# Patient Record
Sex: MALE | Race: WHITE
[De-identification: names, ages, dates, MRNs, and addresses within clinical notes are randomized per-mention and may not be internally consistent; named-entity substitution may affect disease eponyms.]

---

## 2017-02-02 ENCOUNTER — HOSPITAL ENCOUNTER (OUTPATIENT)
Age: 77
Setting detail: OBSERVATION
LOS: 1 days | Discharge: HOME | End: 2017-02-03
Payer: MEDICARE

## 2017-02-02 DIAGNOSIS — K20.9: ICD-10-CM

## 2017-02-02 DIAGNOSIS — R06.83: ICD-10-CM

## 2017-02-02 DIAGNOSIS — K44.9: ICD-10-CM

## 2017-02-02 DIAGNOSIS — N40.0: ICD-10-CM

## 2017-02-02 DIAGNOSIS — F10.20: ICD-10-CM

## 2017-02-02 DIAGNOSIS — D64.9: ICD-10-CM

## 2017-02-02 DIAGNOSIS — K21.9: ICD-10-CM

## 2017-02-02 DIAGNOSIS — Z79.82: ICD-10-CM

## 2017-02-02 DIAGNOSIS — K22.70: Primary | ICD-10-CM

## 2017-02-02 DIAGNOSIS — E66.9: ICD-10-CM

## 2017-02-02 DIAGNOSIS — I15.9: ICD-10-CM

## 2017-02-02 DIAGNOSIS — Z87.891: ICD-10-CM

## 2017-02-02 PROCEDURE — 83735 ASSAY OF MAGNESIUM: CPT

## 2017-02-02 PROCEDURE — 99284 EMERGENCY DEPT VISIT MOD MDM: CPT

## 2017-02-02 PROCEDURE — 85025 COMPLETE CBC W/AUTO DIFF WBC: CPT

## 2017-02-02 PROCEDURE — 80053 COMPREHEN METABOLIC PANEL: CPT

## 2017-02-02 PROCEDURE — 84484 ASSAY OF TROPONIN QUANT: CPT

## 2017-02-02 PROCEDURE — 81001 URINALYSIS AUTO W/SCOPE: CPT

## 2017-02-02 PROCEDURE — 83874 ASSAY OF MYOGLOBIN: CPT

## 2017-02-02 PROCEDURE — 93010 ELECTROCARDIOGRAM REPORT: CPT

## 2017-02-02 PROCEDURE — 43239 EGD BIOPSY SINGLE/MULTIPLE: CPT

## 2017-02-02 PROCEDURE — 84100 ASSAY OF PHOSPHORUS: CPT

## 2017-02-02 PROCEDURE — 93306 TTE W/DOPPLER COMPLETE: CPT

## 2017-02-02 PROCEDURE — 36415 COLL VENOUS BLD VENIPUNCTURE: CPT

## 2017-02-02 PROCEDURE — 80061 LIPID PANEL: CPT

## 2017-02-02 PROCEDURE — 99285 EMERGENCY DEPT VISIT HI MDM: CPT

## 2017-02-02 PROCEDURE — 71010: CPT

## 2017-02-02 PROCEDURE — 93005 ELECTROCARDIOGRAM TRACING: CPT

## 2017-02-02 PROCEDURE — 83690 ASSAY OF LIPASE: CPT

## 2017-02-03 PROCEDURE — 0DB38ZX EXCISION OF LOWER ESOPHAGUS, VIA NATURAL OR ARTIFICIAL OPENING ENDOSCOPIC, DIAGNOSTIC: ICD-10-PCS | Performed by: SURGERY

## 2017-03-13 ENCOUNTER — HOSPITAL ENCOUNTER (OUTPATIENT)
Age: 77
Discharge: HOME | End: 2017-03-13
Payer: MEDICARE

## 2017-03-13 DIAGNOSIS — E55.9: Primary | ICD-10-CM

## 2018-01-17 ENCOUNTER — HOSPITAL ENCOUNTER (OUTPATIENT)
Dept: HOSPITAL 76 - LAB.WCP | Age: 78
Discharge: HOME | End: 2018-01-17
Attending: FAMILY MEDICINE
Payer: MEDICARE

## 2018-01-17 DIAGNOSIS — R97.20: ICD-10-CM

## 2018-01-17 DIAGNOSIS — I10: ICD-10-CM

## 2018-01-17 DIAGNOSIS — Z13.220: Primary | ICD-10-CM

## 2018-01-17 DIAGNOSIS — E55.9: ICD-10-CM

## 2018-01-17 DIAGNOSIS — K62.5: ICD-10-CM

## 2018-01-17 LAB
ALBUMIN DIAFP-MCNC: 4.2 G/DL (ref 3.2–5.5)
ALBUMIN/GLOB SERPL: 1.3 {RATIO} (ref 1–2.2)
ALP SERPL-CCNC: 37 IU/L (ref 42–121)
ALT SERPL W P-5'-P-CCNC: 14 IU/L (ref 10–60)
ANION GAP SERPL CALCULATED.4IONS-SCNC: 5 MMOL/L (ref 6–13)
AST SERPL W P-5'-P-CCNC: 20 IU/L (ref 10–42)
BASOPHILS NFR BLD AUTO: 0.2 10^3/UL (ref 0–0.1)
BASOPHILS NFR BLD AUTO: 1.5 %
BILIRUB BLD-MCNC: 0.4 MG/DL (ref 0.2–1)
BUN SERPL-MCNC: 19 MG/DL (ref 6–20)
CALCIUM UR-MCNC: 8.7 MG/DL (ref 8.5–10.3)
CHLORIDE SERPL-SCNC: 104 MMOL/L (ref 101–111)
CHOLEST SERPL-MCNC: 183 MG/DL
CO2 SERPL-SCNC: 25 MMOL/L (ref 21–32)
CREAT SERPLBLD-SCNC: 1 MG/DL (ref 0.6–1.2)
EOSINOPHIL # BLD AUTO: 0.2 10^3/UL (ref 0–0.7)
EOSINOPHIL NFR BLD AUTO: 1.5 %
ERYTHROCYTE [DISTWIDTH] IN BLOOD BY AUTOMATED COUNT: 14.4 % (ref 12–15)
GFRSERPLBLD MDRD-ARVRAT: 73 ML/MIN/{1.73_M2} (ref 89–?)
GLOBULIN SER-MCNC: 3.2 G/DL (ref 2.1–4.2)
GLUCOSE SERPL-MCNC: 96 MG/DL (ref 70–100)
HDLC SERPL-MCNC: 67 MG/DL
HDLC SERPL: 2.7 {RATIO} (ref ?–5)
HGB UR QL STRIP: 12.4 G/DL (ref 14–18)
LDLC SERPL CALC-MCNC: 95 MG/DL
LDLC/HDLC SERPL: 1.4 {RATIO} (ref ?–3.6)
LYMPHOCYTES # SPEC AUTO: 1.5 10^3/UL (ref 1.5–3.5)
LYMPHOCYTES NFR BLD AUTO: 12.6 %
MCH RBC QN AUTO: 30.6 PG (ref 27–31)
MCHC RBC AUTO-ENTMCNC: 31.7 G/DL (ref 32–36)
MCV RBC AUTO: 96.6 FL (ref 80–94)
MONOCYTES # BLD AUTO: 0.9 10^3/UL (ref 0–1)
MONOCYTES NFR BLD AUTO: 7.8 %
NEUTROPHILS # BLD AUTO: 9.3 10^3/UL (ref 1.5–6.6)
NEUTROPHILS # SNV AUTO: 12.1 X10^3/UL (ref 4.8–10.8)
NEUTROPHILS NFR BLD AUTO: 76.6 %
PDW BLD AUTO: 9.3 FL (ref 7.4–11.4)
PLATELET # BLD: 354 10^3/UL (ref 130–450)
PROT SPEC-MCNC: 7.4 G/DL (ref 6.7–8.2)
PSA FREE MFR SERPL: 19 % (ref 25–100)
PSA FREE SERPL-MCNC: 0.74 NG/ML (ref 0.16–2.81)
PSA SERPL-MCNC: 3.97 NG/ML (ref 0–2)
RBC MAR: 4.05 10^6/UL (ref 4.7–6.1)
SODIUM SERPLBLD-SCNC: 134 MMOL/L (ref 135–145)
VLDLC SERPL-SCNC: 21 MG/DL

## 2018-01-17 PROCEDURE — 83721 ASSAY OF BLOOD LIPOPROTEIN: CPT

## 2018-01-17 PROCEDURE — 85025 COMPLETE CBC W/AUTO DIFF WBC: CPT

## 2018-01-17 PROCEDURE — 80053 COMPREHEN METABOLIC PANEL: CPT

## 2018-01-17 PROCEDURE — 82306 VITAMIN D 25 HYDROXY: CPT

## 2018-01-17 PROCEDURE — 84154 ASSAY OF PSA FREE: CPT

## 2018-01-17 PROCEDURE — 80061 LIPID PANEL: CPT

## 2018-01-17 PROCEDURE — 36415 COLL VENOUS BLD VENIPUNCTURE: CPT

## 2018-05-07 ENCOUNTER — HOSPITAL ENCOUNTER (OUTPATIENT)
Dept: HOSPITAL 76 - SDS | Age: 78
Discharge: HOME | End: 2018-05-07
Attending: SURGERY
Payer: MEDICARE

## 2018-05-07 VITALS — SYSTOLIC BLOOD PRESSURE: 106 MMHG | DIASTOLIC BLOOD PRESSURE: 86 MMHG

## 2018-05-07 DIAGNOSIS — K44.9: ICD-10-CM

## 2018-05-07 DIAGNOSIS — I10: ICD-10-CM

## 2018-05-07 DIAGNOSIS — Z79.82: ICD-10-CM

## 2018-05-07 DIAGNOSIS — Z87.891: ICD-10-CM

## 2018-05-07 DIAGNOSIS — K21.9: ICD-10-CM

## 2018-05-07 DIAGNOSIS — K22.70: Primary | ICD-10-CM

## 2018-05-07 PROCEDURE — 0DB58ZX EXCISION OF ESOPHAGUS, VIA NATURAL OR ARTIFICIAL OPENING ENDOSCOPIC, DIAGNOSTIC: ICD-10-PCS | Performed by: SURGERY

## 2018-05-07 PROCEDURE — 43239 EGD BIOPSY SINGLE/MULTIPLE: CPT

## 2018-05-07 PROCEDURE — 88305 TISSUE EXAM BY PATHOLOGIST: CPT

## 2018-06-16 ENCOUNTER — HOSPITAL ENCOUNTER (EMERGENCY)
Dept: HOSPITAL 76 - ED | Age: 78
Discharge: HOME | End: 2018-06-16
Payer: MEDICARE

## 2018-06-16 VITALS — DIASTOLIC BLOOD PRESSURE: 68 MMHG | SYSTOLIC BLOOD PRESSURE: 113 MMHG

## 2018-06-16 DIAGNOSIS — S09.90XA: Primary | ICD-10-CM
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
## 2018-06-16 DIAGNOSIS — S20.212A: ICD-10-CM

## 2018-06-16 DIAGNOSIS — Z79.82: ICD-10-CM
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
## 2018-06-16 PROCEDURE — 99283 EMERGENCY DEPT VISIT LOW MDM: CPT

## 2018-06-16 PROCEDURE — 70450 CT HEAD/BRAIN W/O DYE: CPT

## 2018-06-16 PROCEDURE — 71101 X-RAY EXAM UNILAT RIBS/CHEST: CPT

## 2018-06-16 NOTE — CT REPORT
Procedure Date:  06/16/2018   

Accession Number:  [id number] / [id number]                    

Procedure:  CT  - Head W/O CPT Code:  

 

FULL RESULT:

 

 

EXAM:

CT HEAD

 

EXAM DATE: 6/16/2018 12:54 PM.

 

CLINICAL HISTORY: Fall, head injury, vomiting.

 

COMPARISON: None.

 

TECHNIQUE: Multiaxial CT images were obtained from the foramen magnum to 

the vertex. Reformats: Coronal. IV contrast: None.

 

In accordance with CT protocol optimization, one or more of the following 

dose reduction techniques were utilized for this exam: automated exposure 

control, adjustment of mA and/or KV based on patient size, or use of 

iterative reconstructive technique.

 

FINDINGS:

Parenchyma: No intraparenchymal hemorrhage. No evidence of mass, midline 

shift, or CT findings of infarction. Gray-white differentiation is 

distinct.

 

Extraaxial Spaces: Normal for age. No subdural or epidural collections 

identified.

 

Ventricles: Normal in size and position.

 

Sinuses and Orbits: Imaged paranasal sinuses, orbits, and mastoids show 

no significant abnormality.

 

Bones: No evidence of fracture or calvarial defect.

 

Other: None.

IMPRESSION:

Ormal head CT.

 

[facility]

## 2018-06-16 NOTE — ED PHYSICIAN DOCUMENTATION
History of Present Illness





- Stated complaint


Stated Complaint: L SHOULDER/BACK PX-FALL





- Chief complaint


Chief Complaint: Back Pain





- History obtained from


History obtained from: Patient, Family





- History of Present Illness


Timing: How many days ago (4)


Pain level max: 6


Pain level now: 3





- Additonal information


Additional information: 





Patient is a 77-year-old male who tripped and fell off of his stairs 4 days ago 

at home.  Landed on his left side, striking his head and left sided ribs.  Now 

has pain in the ribs when he takes a deep breath or coughs.  States does not 

feel short of breath.  He also struck the left side of his head and had 

vomiting yesterday.  Has had mild intermittent headaches since he fell.  Has 

been taken aspirin at home for the headaches.  No other apparent injuries.  

Pain is better with rest and worse with movement.





Review of Systems


Ten Systems: 10 systems reviewed and negative


Constitutional: denies: Fever, Chills


Ears: denies: Ear pain


Nose: denies: Rhinorrhea / runny nose, Congestion


Throat: denies: Sore throat


Cardiac: denies: Chest pain / pressure


Respiratory: denies: Cough


GI: reports: Vomiting (Yesterday several times).  denies: Abdominal Pain, 

Diarrhea


Skin: denies: Rash


Musculoskeletal: denies: Neck pain, Back pain


Neurologic: denies: Focal weakness, Numbness, Confused, Altered mental status





PD PAST MEDICAL HISTORY





- Past Medical History


Past Medical History: Yes


Cardiovascular: Hypertension


Respiratory: None


Endocrine/Autoimmune: None


GI: GERD


: None


HEENT: None


Psych: None


Musculoskeletal: None


Derm: None





- Past Surgical History


General: Colonoscopy, EGD


Ortho: Knee replacement


HEENT: Cataracts





- Present Medications


Home Medications: 


 Ambulatory Orders











 Medication  Instructions  Recorded  Confirmed


 


Lisinopril 10 mg PO DAILY 01/15/16 05/07/18


 


Omeprazole 20 mg PO QDAC 01/15/16 05/07/18


 


Aspirin [Aspirin EC] 81 mg PO DAILY 02/02/17 05/07/18


 


Escitalopram [Lexapro] 10 mg PO DAILY 02/02/17 05/07/18


 


Multivitamin [Multivitamins] 1 each PO DAILY 02/02/17 05/07/18


 


Tamsulosin [Flomax] 0.4 mg PO QDDINNER 02/02/17 05/07/18


 


Acetaminophen [Tylenol] 650 mg PO Q4HR PRN #0 tablet 02/03/17 


 


Cholecalciferol (Vitamin D3) 5,000 unit PO DAILY #30 capsule 02/03/17 05/07/18





[Vitamin D3]   


 


Finasteride [Proscar] 5 mg PO DAILY  tablet 02/03/17 05/07/18


 


Omega-3 Fatty Acids/Fish Oil 1 each PO DAILYWM #90 capsule 02/03/17 05/07/18





[Omega-3 Fish Oil 1,000 mg Sfgl]   


 


Pantoprazole [Protonix] 40 mg PO QDAC #30 tablet 02/03/17 05/07/18


 


Ubidecarenone/Vitamin E Mixed 1 each PO DAILY #30 capsule 02/03/17 05/07/18





[Coq10-Vit E 200 mg-20 Unit Sfg]   


 


Hydrocodone/Acetaminophen 1 - 2 each PO Q6H PRN #9 tablet 06/16/18 





[Hydrocodon-Acetaminophen 5-325]   


 


Meloxicam [Mobic] 7.5 mg PO BID PRN #20 tablet 06/16/18 














- Allergies


Allergies/Adverse Reactions: 


 Allergies











Allergy/AdvReac Type Severity Reaction Status Date / Time


 


No Known Drug Allergies Allergy   Verified 02/02/17 15:30














- Social History


Does the pt smoke?: No


Smoking Status: Never smoker


Does the pt drink ETOH?: Yes


Does the pt have substance abuse?: No





PD ED PE NORMAL





- Vitals


Vital signs reviewed: Yes





- General


General: Alert and oriented X 3, No acute distress, Well developed/nourished





- HEENT


HEENT: PERRL, Ears normal, Moist mucous membranes, Pharynx benign, Other (

Bruising to the left temple area.  No hematoma.  Extraocular movements intact.)





- Neck


Neck: Supple, no meningeal sign, No bony TTP, Other (Full range of motion 

without pain)





- Cardiac


Cardiac: RRR, No murmur





- Respiratory


Respiratory: No respiratory distress, Clear bilaterally





- Abdomen


Abdomen: Soft, Non tender, Non distended





- Back


Back: No spinal TTP, Other (Tender palpation over the left-sided ribs, 2 

through 6, posterior axillary line.  No crepitus.  No ecchymosis.)





- Derm


Derm: Warm and dry





- Extremities


Extremities: No deformity, No tenderness to palpate, Normal ROM s pain





- Neuro


Neuro: Alert and oriented X 3, CNs 2-12 intact, No motor deficit, No sensory 

deficit, Normal speech


Eye Opening: Spontaneous


Motor: Obeys Commands


Verbal: Oriented


GCS Score: 15





- Psych


Psych: Normal mood, Normal affect





Results





- Vitals


Vitals: 


 Vital Signs - 24 hr











  06/16/18





  11:33


 


Temperature 36.5 C


 


Heart Rate 70


 


Respiratory 16





Rate 


 


Blood Pressure 114/78


 


O2 Saturation 99








 Oxygen











O2 Source                      Room air

















- Rads (name of study)


  ** Ribs w/ cxr


Radiology: Prelim report reviewed, EMP read contemporaneously, See rad report (

no acute abnormality.)





  ** head CT


Radiology: Prelim report reviewed, EMP read contemporaneously, See rad report (

normal)





PD MEDICAL DECISION MAKING





- ED course


Complexity details: reviewed results, re-evaluated patient, considered 

differential, d/w patient, d/w family


ED course: 





Patient is a 77-year-old male who presents to the emergency department with a 

closed head injury as well as a left rib injury.  No acute findings on x-ray.  

Pain well controlled.  No hypoxia.  No hemo-or pneumothorax.  We will continue 

supportive care and follow-up with his doctor.  Patient and family counseled 

regarding signs and symptoms for which I believe and urgent re-evaluation would 

be necessary. Patient with good understanding of and agreement to plan and is 

comfortable going home at this time





This document was made in part using voice recognition software. While efforts 

are made to proofread this document, sound alike and grammatical errors may 

occur.





- Sepsis Event


Vital Signs: 


 Vital Signs - 24 hr











  06/16/18





  11:33


 


Temperature 36.5 C


 


Heart Rate 70


 


Respiratory 16





Rate 


 


Blood Pressure 114/78


 


O2 Saturation 99








 Oxygen











O2 Source                      Room air

















Departure





- Departure


Disposition: 01 Home, Self Care


Clinical Impression: 


Head injury


Qualifiers:


 Encounter type: initial encounter Qualified Code(s): S09.90XA - Unspecified 

injury of head, initial encounter





Rib contusion


Qualifiers:


 Encounter type: initial encounter Laterality: left Qualified Code(s): S20.212A 

- Contusion of left front wall of thorax, initial encounter





Condition: Good


Instructions:  ED Head Injury Closed, ED Contusion Vs Minor Fx Rib


Follow-Up: 


Sky Kumar MD [Primary Care Provider] - Within 1 week


Prescriptions: 


Hydrocodone/Acetaminophen [Hydrocodon-Acetaminophen 5-325] 1 - 2 each PO Q6H 

PRN #9 tablet


 PRN Reason: pain


Meloxicam [Mobic] 7.5 mg PO BID PRN #20 tablet


 PRN Reason: Pain


Comments: 


Return if you worsen.  You may continue to have pain for a week or more.  

Sometimes ribs can take several weeks to heal.





Do not drink alcohol or drive while on narcotic pain medicine. 


Note that many narcotic pain relievers also contain tylenol/acetaminophen. 

Please ensure that your total dose of acetaminophen from all sources does not 

exceed 3 grams (3000mg) per day. 


You may constipated on this medication, take a stool softener such as "Colace" 

twice a day while you are on it. Also recommend a over-the-counter laxative 

such as senna or MiraLAX any day that you do not have a bowel movement. 


If you received narcotic pain medication in the emergency department, do not 

drive or operate machinery for the next 24 hours.

## 2018-06-16 NOTE — XRAY REPORT
Procedure Date:  06/16/2018   

Accession Number:  [id number] / [id number]                    

Procedure:  XR  - Ribs w/PA Chest LT CPT Code:  

 

FULL RESULT:

 

 

EXAM:

LEFT RIB RADIOGRAPHY

 

EXAM DATE: 6/16/2018 01:00 PM.

 

CLINICAL HISTORY: Acute pain due to trauma.

 

COMPARISON: 2/2/2017.

 

TECHNIQUE: 1 view of the chest and 2 views of the ribs.

 

FINDINGS:

Bones: Normal. No fracture or bone lesion.

 

Lungs: No focal opacities. No pneumothorax. No pleural effusions.

 

Mediastinum: Heart and mediastinal contours are unremarkable.

 

Other: None.

IMPRESSION: No rib fracture or deformity is identified.

 

[facility]

## 2019-02-21 ENCOUNTER — HOSPITAL ENCOUNTER (OUTPATIENT)
Dept: HOSPITAL 76 - LAB.WCP | Age: 79
Discharge: HOME | End: 2019-02-21
Attending: FAMILY MEDICINE
Payer: MEDICARE

## 2019-02-21 DIAGNOSIS — Z00.00: Primary | ICD-10-CM

## 2019-02-21 LAB
ALBUMIN DIAFP-MCNC: 4 G/DL (ref 3.2–5.5)
ALBUMIN/GLOB SERPL: 1.2 {RATIO} (ref 1–2.2)
ALP SERPL-CCNC: 45 IU/L (ref 42–121)
ALT SERPL W P-5'-P-CCNC: 18 IU/L (ref 10–60)
ANION GAP SERPL CALCULATED.4IONS-SCNC: 9 MMOL/L (ref 6–13)
AST SERPL W P-5'-P-CCNC: 21 IU/L (ref 10–42)
BASOPHILS NFR BLD AUTO: 0 10^3/UL (ref 0–0.1)
BASOPHILS NFR BLD AUTO: 1.1 %
BILIRUB BLD-MCNC: 0.8 MG/DL (ref 0.2–1)
BUN SERPL-MCNC: 18 MG/DL (ref 6–20)
CALCIUM UR-MCNC: 8.9 MG/DL (ref 8.5–10.3)
CHLORIDE SERPL-SCNC: 99 MMOL/L (ref 101–111)
CHOLEST SERPL-MCNC: 184 MG/DL
CO2 SERPL-SCNC: 25 MMOL/L (ref 21–32)
CREAT SERPLBLD-SCNC: 0.9 MG/DL (ref 0.6–1.2)
EOSINOPHIL # BLD AUTO: 0.2 10^3/UL (ref 0–0.7)
EOSINOPHIL NFR BLD AUTO: 3.5 %
ERYTHROCYTE [DISTWIDTH] IN BLOOD BY AUTOMATED COUNT: 13.6 % (ref 12–15)
GFRSERPLBLD MDRD-ARVRAT: 82 ML/MIN/{1.73_M2} (ref 89–?)
GLOBULIN SER-MCNC: 3.3 G/DL (ref 2.1–4.2)
GLUCOSE SERPL-MCNC: 82 MG/DL (ref 70–100)
HDLC SERPL-MCNC: 69 MG/DL
HDLC SERPL: 2.7 {RATIO} (ref ?–5)
HGB UR QL STRIP: 13 G/DL (ref 14–18)
LDLC SERPL CALC-MCNC: 104 MG/DL
LDLC/HDLC SERPL: 1.5 {RATIO} (ref ?–3.6)
LYMPHOCYTES # SPEC AUTO: 2 10^3/UL (ref 1.5–3.5)
LYMPHOCYTES NFR BLD AUTO: 43.9 %
MCH RBC QN AUTO: 31.4 PG (ref 27–31)
MCHC RBC AUTO-ENTMCNC: 32.6 G/DL (ref 32–36)
MCV RBC AUTO: 96.3 FL (ref 80–94)
MONOCYTES # BLD AUTO: 0.5 10^3/UL (ref 0–1)
MONOCYTES NFR BLD AUTO: 10.6 %
NEUTROPHILS # BLD AUTO: 1.8 10^3/UL (ref 1.5–6.6)
NEUTROPHILS # SNV AUTO: 4.5 X10^3/UL (ref 4.8–10.8)
NEUTROPHILS NFR BLD AUTO: 40.9 %
PDW BLD AUTO: 8.4 FL (ref 7.4–11.4)
PLATELET # BLD: 316 10^3/UL (ref 130–450)
PROT SPEC-MCNC: 7.3 G/DL (ref 6.7–8.2)
RBC MAR: 4.14 10^6/UL (ref 4.7–6.1)
SODIUM SERPLBLD-SCNC: 133 MMOL/L (ref 135–145)
VLDLC SERPL-SCNC: 11 MG/DL

## 2019-02-21 PROCEDURE — 84443 ASSAY THYROID STIM HORMONE: CPT

## 2019-02-21 PROCEDURE — 80053 COMPREHEN METABOLIC PANEL: CPT

## 2019-02-21 PROCEDURE — 82306 VITAMIN D 25 HYDROXY: CPT

## 2019-02-21 PROCEDURE — 85025 COMPLETE CBC W/AUTO DIFF WBC: CPT

## 2019-02-21 PROCEDURE — 80061 LIPID PANEL: CPT

## 2019-02-21 PROCEDURE — 84153 ASSAY OF PSA TOTAL: CPT

## 2019-02-21 PROCEDURE — 83721 ASSAY OF BLOOD LIPOPROTEIN: CPT

## 2019-02-21 PROCEDURE — 36415 COLL VENOUS BLD VENIPUNCTURE: CPT

## 2019-04-15 ENCOUNTER — HOSPITAL ENCOUNTER (OUTPATIENT)
Dept: HOSPITAL 76 - EMS | Age: 79
Discharge: ANOTHER HOSPITAL | End: 2019-04-15
Attending: SURGERY
Payer: MEDICARE

## 2019-04-15 ENCOUNTER — HOSPITAL ENCOUNTER (EMERGENCY)
Dept: HOSPITAL 76 - ED | Age: 79
Discharge: HOME | End: 2019-04-15
Payer: MEDICARE

## 2019-04-15 VITALS — DIASTOLIC BLOOD PRESSURE: 85 MMHG | SYSTOLIC BLOOD PRESSURE: 143 MMHG

## 2019-04-15 DIAGNOSIS — I45.10: ICD-10-CM

## 2019-04-15 DIAGNOSIS — Z87.891: ICD-10-CM

## 2019-04-15 DIAGNOSIS — R42: Primary | ICD-10-CM

## 2019-04-15 DIAGNOSIS — E86.0: ICD-10-CM

## 2019-04-15 DIAGNOSIS — I10: ICD-10-CM

## 2019-04-15 LAB
ALBUMIN DIAFP-MCNC: 3.4 G/DL (ref 3.2–5.5)
ALBUMIN/GLOB SERPL: 1.3 {RATIO} (ref 1–2.2)
ALP SERPL-CCNC: 37 IU/L (ref 42–121)
ALT SERPL W P-5'-P-CCNC: 16 IU/L (ref 10–60)
ANION GAP SERPL CALCULATED.4IONS-SCNC: 7 MMOL/L (ref 6–13)
AST SERPL W P-5'-P-CCNC: 21 IU/L (ref 10–42)
BASOPHILS NFR BLD AUTO: 0.1 10^3/UL (ref 0–0.1)
BASOPHILS NFR BLD AUTO: 0.9 %
BILIRUB BLD-MCNC: 0.6 MG/DL (ref 0.2–1)
BUN SERPL-MCNC: 17 MG/DL (ref 6–20)
CALCIUM UR-MCNC: 8.5 MG/DL (ref 8.5–10.3)
CHLORIDE SERPL-SCNC: 104 MMOL/L (ref 101–111)
CO2 SERPL-SCNC: 24 MMOL/L (ref 21–32)
CREAT SERPLBLD-SCNC: 1 MG/DL (ref 0.6–1.2)
EOSINOPHIL # BLD AUTO: 0.2 10^3/UL (ref 0–0.7)
EOSINOPHIL NFR BLD AUTO: 2.9 %
ERYTHROCYTE [DISTWIDTH] IN BLOOD BY AUTOMATED COUNT: 14.1 % (ref 12–15)
GFRSERPLBLD MDRD-ARVRAT: 73 ML/MIN/{1.73_M2} (ref 89–?)
GLOBULIN SER-MCNC: 2.6 G/DL (ref 2.1–4.2)
GLUCOSE SERPL-MCNC: 102 MG/DL (ref 70–100)
HGB UR QL STRIP: 11.9 G/DL (ref 14–18)
LIPASE SERPL-CCNC: 37 U/L (ref 22–51)
LYMPHOCYTES # SPEC AUTO: 1.8 10^3/UL (ref 1.5–3.5)
LYMPHOCYTES NFR BLD AUTO: 30.6 %
MCH RBC QN AUTO: 31.4 PG (ref 27–31)
MCHC RBC AUTO-ENTMCNC: 33.4 G/DL (ref 32–36)
MCV RBC AUTO: 94 FL (ref 80–94)
MONOCYTES # BLD AUTO: 0.7 10^3/UL (ref 0–1)
MONOCYTES NFR BLD AUTO: 11.3 %
NEUTROPHILS # BLD AUTO: 3.3 10^3/UL (ref 1.5–6.6)
NEUTROPHILS # SNV AUTO: 6 X10^3/UL (ref 4.8–10.8)
NEUTROPHILS NFR BLD AUTO: 54.3 %
PDW BLD AUTO: 7.7 FL (ref 7.4–11.4)
PLATELET # BLD: 285 10^3/UL (ref 130–450)
PROT SPEC-MCNC: 6 G/DL (ref 6.7–8.2)
RBC MAR: 3.79 10^6/UL (ref 4.7–6.1)
SODIUM SERPLBLD-SCNC: 135 MMOL/L (ref 135–145)

## 2019-04-15 PROCEDURE — 71045 X-RAY EXAM CHEST 1 VIEW: CPT

## 2019-04-15 PROCEDURE — 93005 ELECTROCARDIOGRAM TRACING: CPT

## 2019-04-15 PROCEDURE — 84484 ASSAY OF TROPONIN QUANT: CPT

## 2019-04-15 PROCEDURE — 85025 COMPLETE CBC W/AUTO DIFF WBC: CPT

## 2019-04-15 PROCEDURE — 96360 HYDRATION IV INFUSION INIT: CPT

## 2019-04-15 PROCEDURE — 83690 ASSAY OF LIPASE: CPT

## 2019-04-15 PROCEDURE — 36415 COLL VENOUS BLD VENIPUNCTURE: CPT

## 2019-04-15 PROCEDURE — 80053 COMPREHEN METABOLIC PANEL: CPT

## 2019-04-15 PROCEDURE — 99283 EMERGENCY DEPT VISIT LOW MDM: CPT

## 2019-04-15 NOTE — ED PHYSICIAN DOCUMENTATION
History of Present Illness





- Stated complaint


Stated Complaint: DIZZINESS





- Chief complaint


Chief Complaint: General





- History obtained from


History obtained from: Patient, EMS





- History of Present Illness


Timing: Today (After doing some outdoor labor this morning felt dizzy starting 

after going from sitting to standing. BIBA. Describes it as lightheaded. It was 

bad enough that he felt he could not stand up d/t same.)





Review of Systems


GI: reports: Nausea, Vomiting (chronic)





PD PAST MEDICAL HISTORY





- Past Medical History


Past Medical History: Yes


Cardiovascular: Hypertension


Respiratory: None


Endocrine/Autoimmune: None


GI: GERD


: None


HEENT: None


Psych: None


Musculoskeletal: None


Derm: None





- Past Surgical History


Past Surgical History: Yes


General: Colonoscopy, EGD


Ortho: Knee replacement


HEENT: Cataracts





- Present Medications


Home Medications: 


                                Ambulatory Orders











 Medication  Instructions  Recorded  Confirmed


 


Lisinopril 10 mg PO DAILY 01/15/16 05/07/18


 


Omeprazole 20 mg PO QDAC 01/15/16 05/07/18


 


Aspirin [Aspirin EC] 81 mg PO DAILY 02/02/17 05/07/18


 


Escitalopram [Lexapro] 10 mg PO DAILY 02/02/17 05/07/18


 


Multivitamin [Multivitamins] 1 each PO DAILY 02/02/17 05/07/18


 


Tamsulosin [Flomax] 0.4 mg PO QDDINNER 02/02/17 05/07/18


 


Acetaminophen [Tylenol] 650 mg PO Q4HR PRN #0 tablet 02/03/17 


 


Cholecalciferol (Vitamin D3) 5,000 unit PO DAILY #30 capsule 02/03/17 05/07/18





[Vitamin D3]   


 


Finasteride [Proscar] 5 mg PO DAILY  tablet 02/03/17 05/07/18


 


Omega-3 Fatty Acids/Fish Oil 1 each PO DAILYWM #90 capsule 02/03/17 05/07/18





[Omega-3 Fish Oil 1,000 mg Sfgl]   


 


Pantoprazole [Protonix] 40 mg PO QDAC #30 tablet 02/03/17 05/07/18


 


Ubidecarenone/Vitamin E Mixed 1 each PO DAILY #30 capsule 02/03/17 05/07/18





[Coq10-Vit E 200 mg-20 Unit Sfg]   


 


Hydrocodone/Acetaminophen 1 - 2 each PO Q6H PRN #9 tablet 06/16/18 





[Hydrocodon-Acetaminophen 5-325]   


 


Meloxicam [Mobic] 7.5 mg PO BID PRN #20 tablet 06/16/18 














- Allergies


Allergies/Adverse Reactions: 


                                    Allergies











Allergy/AdvReac Type Severity Reaction Status Date / Time


 


No Known Drug Allergies Allergy   Verified 04/15/19 12:08














- Social History


Does the pt smoke?: Yes


Smoking Status: Former smoker


Does the pt drink ETOH?: Yes


ETOH Use: Beer


Does the pt have substance abuse?: No





- Family History


Family history: reports: Non contributory





PD ED PE NORMAL





- Vitals


Vital signs reviewed: Yes





- General


General: Alert and oriented X 3, No acute distress





- HEENT


HEENT: PERRL, EOMI





- Neck


Neck: Supple, no meningeal sign, No bony TTP





- Cardiac


Cardiac: RRR, No murmur, Other (Somewhat muffled heart sounds, bedside 

ultrasound demonstrates no pericardial effusion.)





- Respiratory


Respiratory: No respiratory distress, Clear bilaterally





- Abdomen


Abdomen: Soft, Non tender





- Back


Back: No CVA TTP, No spinal TTP





- Derm


Derm: Normal color, Warm and dry





- Extremities


Extremities: No edema, No calf tenderness / cord





- Neuro


Neuro: Alert and oriented X 3, Normal speech





- Psych


Psych: Normal mood, Normal affect





Results





- Vitals


Vitals: 


                               Vital Signs - 24 hr











  04/15/19





  12:08


 


Temperature 36.1 C L


 


Heart Rate 87


 


Respiratory 18





Rate 


 


Blood Pressure 121/75


 


O2 Saturation 98








                                     Oxygen











O2 Source                      Room air

















- EKG (time done)


  ** 1208


Rate: Rate (enter#) (66)


Rhythm: NSR


Axis: Normal


Intervals: RBBB


QRS: Normal


Ischemia: Normal ST segments


Computer interpretation: Agree with computer





- Labs


Labs: 


                                Laboratory Tests











  04/15/19 04/15/19 04/15/19





  12:35 12:35 12:35


 


WBC  6.0  


 


RBC  3.79 L  


 


Hgb  11.9 L  


 


Hct  35.6 L  


 


MCV  94.0  


 


MCH  31.4 H  


 


MCHC  33.4  


 


RDW  14.1  


 


Plt Count  285  


 


MPV  7.7  


 


Neut # (Auto)  3.3  


 


Lymph # (Auto)  1.8  


 


Mono # (Auto)  0.7  


 


Eos # (Auto)  0.2  


 


Baso # (Auto)  0.1  


 


Absolute Nucleated RBC  0.00  


 


Nucleated RBC %  0.0  


 


Sodium   135 


 


Potassium   4.0 


 


Chloride   104 


 


Carbon Dioxide   24 


 


Anion Gap   7.0 


 


BUN   17 


 


Creatinine   1.0 


 


Estimated GFR (MDRD)   73 L 


 


Glucose   102 H 


 


Calcium   8.5 


 


Total Bilirubin   0.6 


 


AST   21 


 


ALT   16 


 


Alkaline Phosphatase   37 L 


 


Troponin I    < 0.04


 


Total Protein   6.0 L 


 


Albumin   3.4 


 


Globulin   2.6 


 


Albumin/Globulin Ratio   1.3 


 


Lipase   37 














- Rads (name of study)


  ** 1v chest


Radiology: EMP read contemporaneously (low volumes, NAD)





PD MEDICAL DECISION MAKING





- ED course


ED course: 





78-year-old gentleman after an episode of dizziness this morning.  Further 

history suggests that it is kind of a combination of disequilibrium and 

orthostasis confirmed with positive orthostatic vital signs here.  After IV 

fluids he was feeling much better.  His neurologic examination is normal and he 

ambulated in the [place] without difficulty or disequilibrium.





Departure





- Departure


Disposition: 01 Home, Self Care


Clinical Impression: 


 Dizziness, Dehydration





Condition: Good


Instructions:  ED Dehydration, ED Dizziness [facility]


Comments: 


Call your doctor to arrange a follow-up appointment, make the next available 

appointment.  In the interim, return anytime if worse or if new symptoms 

develop.

## 2019-04-15 NOTE — XRAY REPORT
Reason:  cp

Procedure Date:  04/15/2019   

Accession Number:  [id number] / [id number]                    

Procedure:  XR  - Chest 1 View X-Ray CPT Code:  46438

 

FULL RESULT:

 

 

EXAM:

CHEST RADIOGRAPHY

 

EXAM DATE: 4/15/2019 12:33 PM.

 

CLINICAL HISTORY: Cp.

 

COMPARISON: RIBS W/PA CHEST LT 06/16/2018 12:46 PM.

 

TECHNIQUE: 1 view.

 

FINDINGS:

Lungs/Pleura: Mildly low lung volumes. No focal lung consolidation. No 

large effusion. No pneumothorax.

 

Mediastinum: Cardiac silhouette size appears unremarkable. Mild vascular 

calcification.

 

Other: None.

 

IMPRESSION: Mildly low lung volumes. No focal lung consolidation or large 

effusions.

 

[facility]

## 2019-07-15 ENCOUNTER — HOSPITAL ENCOUNTER (EMERGENCY)
Dept: HOSPITAL 76 - ED | Age: 79
Discharge: HOME | End: 2019-07-15
Payer: MEDICARE

## 2019-07-15 VITALS — DIASTOLIC BLOOD PRESSURE: 77 MMHG | SYSTOLIC BLOOD PRESSURE: 113 MMHG

## 2019-07-15 DIAGNOSIS — I10: ICD-10-CM

## 2019-07-15 DIAGNOSIS — Z87.891: ICD-10-CM

## 2019-07-15 DIAGNOSIS — E86.0: Primary | ICD-10-CM

## 2019-07-15 DIAGNOSIS — I45.10: ICD-10-CM

## 2019-07-15 DIAGNOSIS — Z79.82: ICD-10-CM

## 2019-07-15 LAB
ALBUMIN DIAFP-MCNC: 4 G/DL (ref 3.2–5.5)
ALBUMIN/GLOB SERPL: 1.3 {RATIO} (ref 1–2.2)
ALP SERPL-CCNC: 51 IU/L (ref 42–121)
ALT SERPL W P-5'-P-CCNC: 17 IU/L (ref 10–60)
ANION GAP SERPL CALCULATED.4IONS-SCNC: 13 MMOL/L (ref 6–13)
AST SERPL W P-5'-P-CCNC: 20 IU/L (ref 10–42)
BASOPHILS NFR BLD AUTO: 0.1 10^3/UL (ref 0–0.1)
BASOPHILS NFR BLD AUTO: 0.9 %
BILIRUB BLD-MCNC: 0.6 MG/DL (ref 0.2–1)
BILIRUB UR QL CFM: NEGATIVE
BUN SERPL-MCNC: 18 MG/DL (ref 6–20)
CALCIUM UR-MCNC: 9.2 MG/DL (ref 8.5–10.3)
CASTS URNS QL MICRO: (no result) /LPF
CHLORIDE SERPL-SCNC: 102 MMOL/L (ref 101–111)
CLARITY UR REFRACT.AUTO: CLEAR
CO2 SERPL-SCNC: 21 MMOL/L (ref 21–32)
CREAT SERPLBLD-SCNC: 1.3 MG/DL (ref 0.6–1.2)
EOSINOPHIL # BLD AUTO: 0.2 10^3/UL (ref 0–0.7)
EOSINOPHIL NFR BLD AUTO: 2 %
ERYTHROCYTE [DISTWIDTH] IN BLOOD BY AUTOMATED COUNT: 13.2 % (ref 12–15)
GFRSERPLBLD MDRD-ARVRAT: 54 ML/MIN/{1.73_M2} (ref 89–?)
GLOBULIN SER-MCNC: 3.1 G/DL (ref 2.1–4.2)
GLUCOSE SERPL-MCNC: 110 MG/DL (ref 70–100)
GLUCOSE UR QL STRIP.AUTO: NEGATIVE MG/DL
HGB UR QL STRIP: 12.8 G/DL (ref 14–18)
KETONES UR QL STRIP.AUTO: 15 MG/DL
LIPASE SERPL-CCNC: 44 U/L (ref 22–51)
LYMPHOCYTES # SPEC AUTO: 2.2 10^3/UL (ref 1.5–3.5)
LYMPHOCYTES NFR BLD AUTO: 29.5 %
MCH RBC QN AUTO: 31.3 PG (ref 27–31)
MCHC RBC AUTO-ENTMCNC: 33.5 G/DL (ref 32–36)
MCV RBC AUTO: 93.4 FL (ref 80–94)
MONOCYTES # BLD AUTO: 0.7 10^3/UL (ref 0–1)
MONOCYTES NFR BLD AUTO: 8.7 %
NEUTROPHILS # BLD AUTO: 4.4 10^3/UL (ref 1.5–6.6)
NEUTROPHILS # SNV AUTO: 7.6 X10^3/UL (ref 4.8–10.8)
NEUTROPHILS NFR BLD AUTO: 58.5 %
NITRITE UR QL STRIP.AUTO: NEGATIVE
PDW BLD AUTO: 10.2 FL (ref 7.4–11.4)
PH UR STRIP.AUTO: 5.5 PH (ref 5–7.5)
PLATELET # BLD: 304 10^3/UL (ref 130–450)
PROT SPEC-MCNC: 7.1 G/DL (ref 6.7–8.2)
PROT UR STRIP.AUTO-MCNC: 30 MG/DL
RBC # UR STRIP.AUTO: NEGATIVE /UL
RBC # URNS HPF: (no result) /HPF (ref 0–5)
RBC MAR: 4.09 10^6/UL (ref 4.7–6.1)
SODIUM SERPLBLD-SCNC: 136 MMOL/L (ref 135–145)
SP GR UR STRIP.AUTO: 1.02 (ref 1–1.03)
SQUAMOUS URNS QL MICRO: (no result)
UROBILINOGEN UR QL STRIP.AUTO: (no result) E.U./DL
UROBILINOGEN UR STRIP.AUTO-MCNC: NEGATIVE MG/DL

## 2019-07-15 PROCEDURE — 81003 URINALYSIS AUTO W/O SCOPE: CPT

## 2019-07-15 PROCEDURE — 93005 ELECTROCARDIOGRAM TRACING: CPT

## 2019-07-15 PROCEDURE — 96360 HYDRATION IV INFUSION INIT: CPT

## 2019-07-15 PROCEDURE — 85025 COMPLETE CBC W/AUTO DIFF WBC: CPT

## 2019-07-15 PROCEDURE — 99283 EMERGENCY DEPT VISIT LOW MDM: CPT

## 2019-07-15 PROCEDURE — 81001 URINALYSIS AUTO W/SCOPE: CPT

## 2019-07-15 PROCEDURE — 36415 COLL VENOUS BLD VENIPUNCTURE: CPT

## 2019-07-15 PROCEDURE — 83690 ASSAY OF LIPASE: CPT

## 2019-07-15 PROCEDURE — 80053 COMPREHEN METABOLIC PANEL: CPT

## 2019-07-15 PROCEDURE — 71045 X-RAY EXAM CHEST 1 VIEW: CPT

## 2019-07-15 PROCEDURE — 87086 URINE CULTURE/COLONY COUNT: CPT

## 2019-07-15 PROCEDURE — 84484 ASSAY OF TROPONIN QUANT: CPT

## 2019-07-15 NOTE — XRAY REPORT
Reason:  dizziness/palpitations

Procedure Date:  07/15/2019   

Accession Number:  [id number] / [id number]                    

Procedure:  XR  - Chest 1 View X-Ray CPT Code:  71203

 

FULL RESULT:

 

 

EXAM:

CHEST RADIOGRAPHY

 

EXAM DATE: 7/15/2019 05:00 PM.

 

CLINICAL HISTORY: Dizziness. Palpitations.

 

COMPARISON: CHEST 1 VIEW 04/15/2019 12:20 PM

RIBS W/PA CHEST LT 06/16/2018 12:46 PM.

 

TECHNIQUE: 1 view.

 

FINDINGS:

Lungs/Pleura: No focal opacities evident. No pleural effusion. No 

pneumothorax.

 

Mediastinum: Within exam limitations, the cardiomediastinal contour is 

normal. Prominent cardiophrenic fat pads noted.

 

Other: None.

 

IMPRESSION: Normal single view chest.

 

[facility]

## 2019-07-15 NOTE — ED PHYSICIAN DOCUMENTATION
History of Present Illness





- Stated complaint


Stated Complaint: LOW BLOOD PRESSURE, HIGH HEART RATE





- Chief complaint


Chief Complaint: Cardiac





- History obtained from


History obtained from: Patient





- History of Present Illness


Timing: Today


Pain level max: 0


Pain level now: 0





- Additonal information


Additional information: 





states felt dizzy while driving today. States BP was 130/80, then 30 mins later 

was 80/40.  No vomiting. No headches. no chest pain. no palpitations.  Felt 

lightheaded. No similar symptoms in the past. no neck or back pain,





Review of Systems


Constitutional: denies: Fever, Chills


GI: denies: Vomiting, Diarrhea


Skin: denies: Rash


Musculoskeletal: denies: Neck pain, Back pain


Neurologic: denies: Headache





PD PAST MEDICAL HISTORY





- Past Medical History


Cardiovascular: Hypertension


Respiratory: None


Endocrine/Autoimmune: None


GI: GERD


: None


HEENT: None


Psych: None


Musculoskeletal: None


Derm: None





- Past Surgical History


Past Surgical History: Yes


General: Colonoscopy, EGD


Ortho: Knee replacement


HEENT: Cataracts





- Present Medications


Home Medications: 


                                Ambulatory Orders











 Medication  Instructions  Recorded  Confirmed


 


Lisinopril 10 mg PO DAILY 01/15/16 05/07/18


 


Omeprazole 20 mg PO QDAC 01/15/16 05/07/18


 


Aspirin [Aspirin EC] 81 mg PO DAILY 02/02/17 05/07/18


 


Escitalopram [Lexapro] 10 mg PO DAILY 02/02/17 05/07/18


 


Multivitamin [Multivitamins] 1 each PO DAILY 02/02/17 05/07/18


 


Tamsulosin [Flomax] 0.4 mg PO QDDINNER 02/02/17 05/07/18


 


Acetaminophen [Tylenol] 650 mg PO Q4HR PRN #0 tablet 02/03/17 


 


Cholecalciferol (Vitamin D3) 5,000 unit PO DAILY #30 capsule 02/03/17 05/07/18





[Vitamin D3]   


 


Finasteride [Proscar] 5 mg PO DAILY  tablet 02/03/17 05/07/18


 


Omega-3 Fatty Acids/Fish Oil 1 each PO DAILYWM #90 capsule 02/03/17 05/07/18





[Omega-3 Fish Oil 1,000 mg Sfgl]   


 


Pantoprazole [Protonix] 40 mg PO QDAC #30 tablet 02/03/17 05/07/18


 


Ubidecarenone/Vitamin E Mixed 1 each PO DAILY #30 capsule 02/03/17 05/07/18





[Coq10-Vit E 200 mg-20 Unit Sfg]   


 


Hydrocodone/Acetaminophen 1 - 2 each PO Q6H PRN #9 tablet 06/16/18 





[Hydrocodon-Acetaminophen 5-325]   


 


Meloxicam [Mobic] 7.5 mg PO BID PRN #20 tablet 06/16/18 














- Allergies


Allergies/Adverse Reactions: 


                                    Allergies











Allergy/AdvReac Type Severity Reaction Status Date / Time


 


No Known Drug Allergies Allergy   Verified 07/15/19 16:40














- Social History


Does the pt smoke?: Yes


Smoking Status: Former smoker


Does the pt drink ETOH?: Yes


Does the pt have substance abuse?: No





PD ED PE NORMAL





- Vitals


Vital signs reviewed: Yes





- General


General: Alert and oriented X 3, No acute distress





- HEENT


HEENT: Moist mucous membranes





- Neck


Neck: Supple, no meningeal sign





- Cardiac


Cardiac: RRR, No murmur, Strong equal pulses





- Respiratory


Respiratory: No respiratory distress, Clear bilaterally





- Abdomen


Abdomen: Soft, Non tender, Non distended





- Back


Back: No CVA TTP, No spinal TTP





- Derm


Derm: Warm and dry, No rash





- Neuro


Neuro: Alert and oriented X 3





- Psych


Psych: Normal mood, Normal affect





Results





- Vitals


Vitals: 


                               Vital Signs - 24 hr











  07/15/19 07/15/19 07/15/19





  16:30 17:00 18:46


 


Temperature 36.0 C L  


 


Heart Rate 80 60 


 


Heart Rate [   76





Sitting]   


 


Heart Rate [   76





Standing]   


 


Heart Rate [   70





Supine]   


 


Respiratory 12 17 





Rate   


 


Blood Pressure 94/60 94/61 


 


Blood Pressure   116/62





[Sitting]   


 


Blood Pressure   109/56 L





[Standing]   


 


Blood Pressure   107/65





[Supine]   


 


O2 Saturation 96 99 














  07/15/19





  20:01


 


Temperature 36.7 C


 


Heart Rate 77


 


Heart Rate [ 





Sitting] 


 


Heart Rate [ 





Standing] 


 


Heart Rate [ 





Supine] 


 


Respiratory 17





Rate 


 


Blood Pressure 113/77


 


Blood Pressure 





[Sitting] 


 


Blood Pressure 





[Standing] 


 


Blood Pressure 





[Supine] 


 


O2 Saturation 99








                                     Oxygen











O2 Source                      Room air

















- EKG (time done)


  ** 1631


Rate: Rate (enter#) (85)


Rhythm: NSR


Intervals: Normal VA, RBBB


Ischemia: Normal ST segments





- Labs


Labs: 


                                Laboratory Tests











  07/15/19 07/15/19 07/15/19





  16:45 16:45 16:45


 


WBC  7.6  


 


RBC  4.09 L  


 


Hgb  12.8 L  


 


Hct  38.2 L  


 


MCV  93.4  


 


MCH  31.3 H  


 


MCHC  33.5  


 


RDW  13.2  


 


Plt Count  304  


 


MPV  10.2  


 


Neut # (Auto)  4.4  


 


Lymph # (Auto)  2.2  


 


Mono # (Auto)  0.7  


 


Eos # (Auto)  0.2  


 


Baso # (Auto)  0.1  


 


Absolute Nucleated RBC  0.00  


 


Nucleated RBC %  0.0  


 


Sodium   136 


 


Potassium   3.8 


 


Chloride   102 


 


Carbon Dioxide   21 


 


Anion Gap   13.0 


 


BUN   18 


 


Creatinine   1.3 H 


 


Estimated GFR (MDRD)   54 L 


 


Glucose   110 H 


 


Calcium   9.2 


 


Total Bilirubin   0.6 


 


AST   20 


 


ALT   17 


 


Alkaline Phosphatase   51 


 


Troponin I    < 0.04


 


Total Protein   7.1 


 


Albumin   4.0 


 


Globulin   3.1 


 


Albumin/Globulin Ratio   1.3 


 


Lipase   44 


 


Urine Color   


 


Urine Clarity   


 


Urine pH   


 


Ur Specific Gravity   


 


Urine Protein   


 


Urine Glucose (UA)   


 


Urine Ketones   


 


Urine Occult Blood   


 


Urine Nitrite   


 


Urine Bilirubin   


 


Urine Urobilinogen   


 


Ur Leukocyte Esterase   


 


Urine RBC   


 


Urine WBC   


 


Ur Squamous Epith Cells   


 


Urine Bacteria   


 


Urine Casts   


 


Ur Microscopic Review   


 


Urine Culture Comments   














  07/15/19





  18:35


 


WBC 


 


RBC 


 


Hgb 


 


Hct 


 


MCV 


 


MCH 


 


MCHC 


 


RDW 


 


Plt Count 


 


MPV 


 


Neut # (Auto) 


 


Lymph # (Auto) 


 


Mono # (Auto) 


 


Eos # (Auto) 


 


Baso # (Auto) 


 


Absolute Nucleated RBC 


 


Nucleated RBC % 


 


Sodium 


 


Potassium 


 


Chloride 


 


Carbon Dioxide 


 


Anion Gap 


 


BUN 


 


Creatinine 


 


Estimated GFR (MDRD) 


 


Glucose 


 


Calcium 


 


Total Bilirubin 


 


AST 


 


ALT 


 


Alkaline Phosphatase 


 


Troponin I 


 


Total Protein 


 


Albumin 


 


Globulin 


 


Albumin/Globulin Ratio 


 


Lipase 


 


Urine Color  DARK YELLOW


 


Urine Clarity  CLEAR


 


Urine pH  5.5


 


Ur Specific Gravity  1.025


 


Urine Protein  30 H


 


Urine Glucose (UA)  NEGATIVE


 


Urine Ketones  15 H


 


Urine Occult Blood  NEGATIVE


 


Urine Nitrite  NEGATIVE


 


Urine Bilirubin  NEGATIVE


 


Urine Urobilinogen  0.2 (NORMAL)


 


Ur Leukocyte Esterase  TRACE H


 


Urine RBC  0-5


 


Urine WBC  0-3


 


Ur Squamous Epith Cells  NONE SEEN


 


Urine Bacteria  None Seen


 


Urine Casts  26-50 Hyaline Casts


 


Ur Microscopic Review  INDICATED


 


Urine Culture Comments  INDICATED














- Rads (name of study)


  ** cxr


Radiology: Prelim report reviewed, EMP read contemporaneously, See rad report 

(no acute disease)





PD MEDICAL DECISION MAKING





- ED course


Complexity details: reviewed old records, reviewed results, re-evaluated 

patient, considered differential, d/w patient, d/w family


ED course: 





Patient with positive orthostatics.  Feels much better after IV fluids.  

Symptoms resolved.  No longer orthostatic.  Ambulating without difficulty.  We 

will follow-up with his doctor for further care.  Patient counseled regarding 

signs and symptoms for which I believe and urgent re-evaluation would be 

necessary. Patient with good understanding of and agreement to plan and is 

comfortable going home at this time





This document was made in part using voice recognition software. While efforts 

are made to proofread this document, sound alike and grammatical errors may 

occur.





Departure





- Departure


Disposition: 01 Home, Self Care


Clinical Impression: 


 Dehydration





Condition: Good


Instructions:  ED Dehydration


Follow-Up: 


DARRON BANG MD [Primary Care Provider] - Within 1 week


Comments: 


Drink plenty of water at home.  Return if you worsen.  You can also try drinking

and oral rehydration solution.  You can find recipes for this online.  Gatorade 

works as well.


Discharge Date/Time: 07/15/19 20:01

## 2020-01-13 ENCOUNTER — HOSPITAL ENCOUNTER (OUTPATIENT)
Dept: HOSPITAL 76 - LAB.R | Age: 80
Discharge: HOME | End: 2020-01-13
Attending: FAMILY MEDICINE
Payer: MEDICARE

## 2020-01-13 DIAGNOSIS — K52.9: Primary | ICD-10-CM

## 2020-01-13 LAB — H PYLORI AG STL QL IA.RAPID: NEGATIVE

## 2020-01-13 PROCEDURE — 87209 SMEAR COMPLEX STAIN: CPT

## 2020-01-13 PROCEDURE — 87177 OVA AND PARASITES SMEARS: CPT

## 2020-01-13 PROCEDURE — 83630 LACTOFERRIN FECAL (QUAL): CPT

## 2020-01-13 PROCEDURE — 81599 UNLISTED MAAA: CPT

## 2020-01-13 PROCEDURE — 87493 C DIFF AMPLIFIED PROBE: CPT

## 2020-01-13 PROCEDURE — 87338 HPYLORI STOOL AG IA: CPT

## 2020-01-13 PROCEDURE — 87329 GIARDIA AG IA: CPT

## 2020-01-13 PROCEDURE — 87046 STOOL CULTR AEROBIC BACT EA: CPT

## 2020-01-13 PROCEDURE — 87045 FECES CULTURE AEROBIC BACT: CPT

## 2020-02-20 ENCOUNTER — HOSPITAL ENCOUNTER (OUTPATIENT)
Dept: HOSPITAL 76 - LAB.N | Age: 80
Discharge: HOME | End: 2020-02-20
Attending: FAMILY MEDICINE
Payer: MEDICARE

## 2020-02-20 DIAGNOSIS — I10: Primary | ICD-10-CM

## 2020-02-20 DIAGNOSIS — E55.9: ICD-10-CM

## 2020-02-20 DIAGNOSIS — Z12.5: ICD-10-CM

## 2020-02-20 LAB
ALBUMIN DIAFP-MCNC: 4.2 G/DL (ref 3.2–5.5)
ALBUMIN/GLOB SERPL: 1.4 {RATIO} (ref 1–2.2)
ALP SERPL-CCNC: 42 IU/L (ref 42–121)
ALT SERPL W P-5'-P-CCNC: 20 IU/L (ref 10–60)
ANION GAP SERPL CALCULATED.4IONS-SCNC: 6 MMOL/L (ref 6–13)
AST SERPL W P-5'-P-CCNC: 25 IU/L (ref 10–42)
BASOPHILS NFR BLD AUTO: 0.1 10^3/UL (ref 0–0.1)
BASOPHILS NFR BLD AUTO: 1.5 %
BILIRUB BLD-MCNC: 0.5 MG/DL (ref 0.2–1)
BUN SERPL-MCNC: 19 MG/DL (ref 6–20)
CALCIUM UR-MCNC: 9.1 MG/DL (ref 8.5–10.3)
CHLORIDE SERPL-SCNC: 102 MMOL/L (ref 101–111)
CHOLEST SERPL-MCNC: 202 MG/DL
CO2 SERPL-SCNC: 26 MMOL/L (ref 21–32)
CREAT SERPLBLD-SCNC: 1 MG/DL (ref 0.6–1.2)
EOSINOPHIL # BLD AUTO: 0.2 10^3/UL (ref 0–0.7)
EOSINOPHIL NFR BLD AUTO: 4.1 %
ERYTHROCYTE [DISTWIDTH] IN BLOOD BY AUTOMATED COUNT: 14.4 % (ref 12–15)
GFRSERPLBLD MDRD-ARVRAT: 72 ML/MIN/{1.73_M2} (ref 89–?)
GLOBULIN SER-MCNC: 3 G/DL (ref 2.1–4.2)
GLUCOSE SERPL-MCNC: 93 MG/DL (ref 70–100)
HDLC SERPL-MCNC: 81 MG/DL
HDLC SERPL: 2.5 {RATIO} (ref ?–5)
HGB UR QL STRIP: 11.6 G/DL (ref 14–18)
LYMPHOCYTES # SPEC AUTO: 2.1 10^3/UL (ref 1.5–3.5)
LYMPHOCYTES NFR BLD AUTO: 39 %
MCH RBC QN AUTO: 30.6 PG (ref 27–31)
MCHC RBC AUTO-ENTMCNC: 32.7 G/DL (ref 32–36)
MCV RBC AUTO: 93.7 FL (ref 80–94)
MONOCYTES # BLD AUTO: 0.6 10^3/UL (ref 0–1)
MONOCYTES NFR BLD AUTO: 10.7 %
NEUTROPHILS # BLD AUTO: 2.4 10^3/UL (ref 1.5–6.6)
NEUTROPHILS # SNV AUTO: 5.4 X10^3/UL (ref 4.8–10.8)
NEUTROPHILS NFR BLD AUTO: 44.1 %
PDW BLD AUTO: 10.8 FL (ref 7.4–11.4)
PLATELET # BLD: 329 10^3/UL (ref 130–450)
PROT SPEC-MCNC: 7.2 G/DL (ref 6.7–8.2)
RBC MAR: 3.79 10^6/UL (ref 4.7–6.1)
SODIUM SERPLBLD-SCNC: 134 MMOL/L (ref 135–145)

## 2020-02-20 PROCEDURE — 83721 ASSAY OF BLOOD LIPOPROTEIN: CPT

## 2020-02-20 PROCEDURE — 80061 LIPID PANEL: CPT

## 2020-02-20 PROCEDURE — 82306 VITAMIN D 25 HYDROXY: CPT

## 2020-02-20 PROCEDURE — 84153 ASSAY OF PSA TOTAL: CPT

## 2020-02-20 PROCEDURE — 84443 ASSAY THYROID STIM HORMONE: CPT

## 2020-02-20 PROCEDURE — 85025 COMPLETE CBC W/AUTO DIFF WBC: CPT

## 2020-02-20 PROCEDURE — 80053 COMPREHEN METABOLIC PANEL: CPT

## 2020-02-20 PROCEDURE — 36415 COLL VENOUS BLD VENIPUNCTURE: CPT

## 2020-03-05 ENCOUNTER — HOSPITAL ENCOUNTER (OUTPATIENT)
Dept: HOSPITAL 76 - LAB.N | Age: 80
Discharge: HOME | End: 2020-03-05
Attending: FAMILY MEDICINE
Payer: MEDICARE

## 2020-03-05 DIAGNOSIS — D64.9: Primary | ICD-10-CM

## 2020-03-05 LAB
BASOPHILS NFR BLD AUTO: 0.1 10^3/UL (ref 0–0.1)
BASOPHILS NFR BLD AUTO: 1.5 %
EOSINOPHIL # BLD AUTO: 0.5 10^3/UL (ref 0–0.7)
EOSINOPHIL NFR BLD AUTO: 6.8 %
ERYTHROCYTE [DISTWIDTH] IN BLOOD BY AUTOMATED COUNT: 14.4 % (ref 12–15)
FERRITIN SERPL-MCNC: 18.3 NG/ML (ref 23.9–336.2)
FOLATE SERPL-MCNC: 20.81 NG/ML
HGB UR QL STRIP: 11.9 G/DL (ref 14–18)
IRON SATN MFR SERPL: 14 % (ref 20–50)
IRON SERPL-MCNC: 63 UG/DL (ref 45–182)
LYMPHOCYTES # SPEC AUTO: 1.7 10^3/UL (ref 1.5–3.5)
LYMPHOCYTES NFR BLD AUTO: 23.6 %
MCH RBC QN AUTO: 31.2 PG (ref 27–31)
MCHC RBC AUTO-ENTMCNC: 33.6 G/DL (ref 32–36)
MCV RBC AUTO: 92.7 FL (ref 80–94)
MONOCYTES # BLD AUTO: 0.8 10^3/UL (ref 0–1)
MONOCYTES NFR BLD AUTO: 11 %
NEUTROPHILS # BLD AUTO: 4.1 10^3/UL (ref 1.5–6.6)
NEUTROPHILS # SNV AUTO: 7.2 X10^3/UL (ref 4.8–10.8)
NEUTROPHILS NFR BLD AUTO: 56.8 %
PDW BLD AUTO: 10.3 FL (ref 7.4–11.4)
PLATELET # BLD: 309 10^3/UL (ref 130–450)
RBC MAR: 3.82 10^6/UL (ref 4.7–6.1)
RETICS # AUTO: 0.05 10^6/UL (ref 0.02–0.11)
TIBC SERPL-MCNC: 441 UG/DL (ref 250–450)
TRANSFERRIN SERPL-MCNC: 315 MG/DL (ref 180–329)
VIT B12 SERPL-MCNC: 159 PG/ML (ref 180–914)

## 2020-03-05 PROCEDURE — 82728 ASSAY OF FERRITIN: CPT

## 2020-03-05 PROCEDURE — 83540 ASSAY OF IRON: CPT

## 2020-03-05 PROCEDURE — 85025 COMPLETE CBC W/AUTO DIFF WBC: CPT

## 2020-03-05 PROCEDURE — 84466 ASSAY OF TRANSFERRIN: CPT

## 2020-03-05 PROCEDURE — 36415 COLL VENOUS BLD VENIPUNCTURE: CPT

## 2020-03-05 PROCEDURE — 82607 VITAMIN B-12: CPT

## 2020-03-05 PROCEDURE — 82746 ASSAY OF FOLIC ACID SERUM: CPT

## 2020-03-05 PROCEDURE — 85045 AUTOMATED RETICULOCYTE COUNT: CPT

## 2021-02-24 ENCOUNTER — HOSPITAL ENCOUNTER (OUTPATIENT)
Dept: HOSPITAL 76 - EMS | Age: 81
Discharge: ANOTHER HOSPITAL | End: 2021-02-24
Attending: NURSE PRACTITIONER
Payer: MEDICARE

## 2021-02-24 ENCOUNTER — HOSPITAL ENCOUNTER (OUTPATIENT)
Dept: HOSPITAL 76 - ED | Age: 81
Setting detail: OBSERVATION
LOS: 1 days | Discharge: HOME | End: 2021-02-25
Attending: NURSE PRACTITIONER | Admitting: NURSE PRACTITIONER
Payer: MEDICARE

## 2021-02-24 DIAGNOSIS — N40.0: ICD-10-CM

## 2021-02-24 DIAGNOSIS — R07.9: Primary | ICD-10-CM

## 2021-02-24 DIAGNOSIS — R20.2: ICD-10-CM

## 2021-02-24 DIAGNOSIS — Z79.82: ICD-10-CM

## 2021-02-24 DIAGNOSIS — Z20.822: ICD-10-CM

## 2021-02-24 DIAGNOSIS — Z79.899: ICD-10-CM

## 2021-02-24 DIAGNOSIS — I10: ICD-10-CM

## 2021-02-24 DIAGNOSIS — K21.9: ICD-10-CM

## 2021-02-24 DIAGNOSIS — M79.622: Primary | ICD-10-CM

## 2021-02-24 LAB
ALBUMIN DIAFP-MCNC: 3.9 G/DL (ref 3.2–5.5)
ALBUMIN/GLOB SERPL: 1.3 {RATIO} (ref 1–2.2)
ALP SERPL-CCNC: 46 IU/L (ref 42–121)
ALT SERPL W P-5'-P-CCNC: 13 IU/L (ref 10–60)
ANION GAP SERPL CALCULATED.4IONS-SCNC: 11 MMOL/L (ref 6–13)
AST SERPL W P-5'-P-CCNC: 16 IU/L (ref 10–42)
B PARAPERT DNA SPEC QL NAA+PROBE: NOT DETECTED
B PERT DNA SPEC QL NAA+PROBE: NOT DETECTED
BASOPHILS NFR BLD AUTO: 0.1 10^3/UL (ref 0–0.1)
BASOPHILS NFR BLD AUTO: 1.2 %
BILIRUB BLD-MCNC: 0.5 MG/DL (ref 0.2–1)
BUN SERPL-MCNC: 17 MG/DL (ref 6–20)
C PNEUM DNA NPH QL NAA+NON-PROBE: NOT DETECTED
CALCIUM UR-MCNC: 9.2 MG/DL (ref 8.5–10.3)
CHLORIDE SERPL-SCNC: 103 MMOL/L (ref 101–111)
CO2 SERPL-SCNC: 22 MMOL/L (ref 21–32)
CREAT SERPLBLD-SCNC: 0.8 MG/DL (ref 0.6–1.2)
EOSINOPHIL # BLD AUTO: 0.2 10^3/UL (ref 0–0.7)
EOSINOPHIL NFR BLD AUTO: 3.5 %
ERYTHROCYTE [DISTWIDTH] IN BLOOD BY AUTOMATED COUNT: 12.9 % (ref 12–15)
FLUAV RNA RESP QL NAA+PROBE: NOT DETECTED
GFRSERPLBLD MDRD-ARVRAT: 93 ML/MIN/{1.73_M2} (ref 89–?)
GLOBULIN SER-MCNC: 2.9 G/DL (ref 2.1–4.2)
GLUCOSE SERPL-MCNC: 82 MG/DL (ref 70–100)
HAEM INFLU B DNA SPEC QL NAA+PROBE: NOT DETECTED
HCOV 229E RNA SPEC QL NAA+PROBE: NOT DETECTED
HCOV HKU1 RNA UPPER RESP QL NAA+PROBE: NOT DETECTED
HCOV NL63 RNA ASPIRATE QL NAA+PROBE: NOT DETECTED
HCOV OC43 RNA SPEC QL NAA+PROBE: NOT DETECTED
HCT VFR BLD AUTO: 35.3 % (ref 42–52)
HGB UR QL STRIP: 11.8 G/DL (ref 14–18)
HMPV AG SPEC QL: NOT DETECTED
HPIV1 RNA NPH QL NAA+PROBE: NOT DETECTED
HPIV2 SPEC QL CULT: NOT DETECTED
HPIV3 AB TITR SER CF: NOT DETECTED {TITER}
HPIV4 RNA SPEC QL NAA+PROBE: NOT DETECTED
LIPASE SERPL-CCNC: 30 U/L (ref 22–51)
LYMPHOCYTES # SPEC AUTO: 2.9 10^3/UL (ref 1.5–3.5)
LYMPHOCYTES NFR BLD AUTO: 42.7 %
M PNEUMO DNA SPEC QL NAA+PROBE: NOT DETECTED
MCH RBC QN AUTO: 31.6 PG (ref 27–31)
MCHC RBC AUTO-ENTMCNC: 33.4 G/DL (ref 32–36)
MCV RBC AUTO: 94.4 FL (ref 80–94)
MONOCYTES # BLD AUTO: 0.7 10^3/UL (ref 0–1)
MONOCYTES NFR BLD AUTO: 10.6 %
NEUTROPHILS # BLD AUTO: 2.8 10^3/UL (ref 1.5–6.6)
NEUTROPHILS # SNV AUTO: 6.8 X10^3/UL (ref 4.8–10.8)
NEUTROPHILS NFR BLD AUTO: 41.7 %
NRBC # BLD AUTO: 0 /100WBC
NRBC # BLD AUTO: 0 X10^3/UL
PDW BLD AUTO: 9.7 FL (ref 7.4–11.4)
PLATELET # BLD: 276 10^3/UL (ref 130–450)
POTASSIUM SERPL-SCNC: 4 MMOL/L (ref 3.5–5)
PROT SPEC-MCNC: 6.8 G/DL (ref 6.7–8.2)
RBC MAR: 3.74 10^6/UL (ref 4.7–6.1)
RSV RNA RESP QL NAA+PROBE: NOT DETECTED
RV+EV RNA SPEC QL NAA+PROBE: NOT DETECTED
SARS-COV-2 RNA PNL SPEC NAA+PROBE: NOT DETECTED
SODIUM SERPLBLD-SCNC: 136 MMOL/L (ref 135–145)

## 2021-02-24 PROCEDURE — 83721 ASSAY OF BLOOD LIPOPROTEIN: CPT

## 2021-02-24 PROCEDURE — 0202U NFCT DS 22 TRGT SARS-COV-2: CPT

## 2021-02-24 PROCEDURE — 99285 EMERGENCY DEPT VISIT HI MDM: CPT

## 2021-02-24 PROCEDURE — 71045 X-RAY EXAM CHEST 1 VIEW: CPT

## 2021-02-24 PROCEDURE — 85025 COMPLETE CBC W/AUTO DIFF WBC: CPT

## 2021-02-24 PROCEDURE — 84484 ASSAY OF TROPONIN QUANT: CPT

## 2021-02-24 PROCEDURE — 87631 RESP VIRUS 3-5 TARGETS: CPT

## 2021-02-24 PROCEDURE — 83690 ASSAY OF LIPASE: CPT

## 2021-02-24 PROCEDURE — 93306 TTE W/DOPPLER COMPLETE: CPT

## 2021-02-24 PROCEDURE — 80048 BASIC METABOLIC PNL TOTAL CA: CPT

## 2021-02-24 PROCEDURE — 96372 THER/PROPH/DIAG INJ SC/IM: CPT

## 2021-02-24 PROCEDURE — 36415 COLL VENOUS BLD VENIPUNCTURE: CPT

## 2021-02-24 PROCEDURE — 99284 EMERGENCY DEPT VISIT MOD MDM: CPT

## 2021-02-24 PROCEDURE — 80053 COMPREHEN METABOLIC PANEL: CPT

## 2021-02-24 PROCEDURE — 80061 LIPID PANEL: CPT

## 2021-02-24 PROCEDURE — 93005 ELECTROCARDIOGRAM TRACING: CPT

## 2021-02-24 RX ADMIN — FAMOTIDINE SCH MG: 20 TABLET, FILM COATED ORAL at 20:55

## 2021-02-24 RX ADMIN — SODIUM CHLORIDE, PRESERVATIVE FREE SCH ML: 5 INJECTION INTRAVENOUS at 17:35

## 2021-02-24 NOTE — HISTORY & PHYSICAL EXAMINATION
Chief Complaint





- Chief Complaint


Chief Complaint: chest pain





History of Present Illness





- Admitted From


Admitted From:: ER 





- History Obtained From


Records Reviewed: [facility]


History obtained from: pt


Exam Limitations: no





- History of Present Illness


HPI Comment/Other: 


80-year-old male With a past medical history significant for hypertension, BPH, 

GERD who presents the emergency department for evaluation of acute onset left-

sided chest pain.  Pt. brought in by EMS. He reports that at approximately 1230 

noon when he was walking at [employer], he felt pressure pain on his left chest 

that radiated to his left arm. He also feel some numbness in his left arm but he

has no weakness. He denies any other unilateral or bilateral weakness or 

paresthesia as well. The Pain was 5 out of 10 then pain has gone down to 3 out 

of 10. He denies any previous history of myocardial infarction. He also denies 

nausea, vomiting or associated shortness of breath. pt does have a history of 

hypertension, he is Non-smoker. Initially troponin is negative.  EKG shows 

nonischemic change with hx of right bundle branch unchanged since from 2019. 

Routine laboratory test was unremarkable. Chest x-ray was unremarkable as well. 

In ER, he is afebrile, patient is hemodynamic stable. 


Discussed the care goal with the patient, patient request full code but he does 

not want to have life support.  


 








History





- Past Medical History


Cardiovascular: reports: Hypertension


Respiratory: reports: None


Endocrine/Autoimmune: reports: None


GI: reports: GERD


: reports: None


HEENT: reports: None


Psych: reports: None


Musculoskeletal: reports: None


Derm: reports: None


MRSA Hx?: No





- Past Surgical History


General: reports: Colonoscopy, EGD


Ortho: reports: Knee replacement


HEENT: reports: Cataracts





- Family & Social History


Family History: Mother: , Father: 


Family History Comment/Other: Patient reported his father  at the age of 60 

due to cardiac issue. His mother  at age 62 because of alcohol problem. He 

was rised by his uncle.


Social History Notes: Patient denies history of Cigarette smoking, Alcohol and 

drug issue. he Is  live at [place] with his partner, he had 2 children.





Meds/Allgy





- Home Medications


Home Medications: 


                                Ambulatory Orders











 Medication  Instructions  Recorded  Confirmed


 


Omeprazole 20 mg PO BID 01/15/16 05/07/18


 


lisinopriL [Lisinopril] 10 mg PO DAILY 01/15/16 05/07/18


 


Aspirin [Aspirin EC] 81 mg PO DAILY 17


 


Escitalopram [Lexapro] 10 mg PO DAILY 17


 


Multivitamin [Multivitamins] 1 each PO DAILY 17


 


Tamsulosin [Flomax] 0.4 mg PO QDDINNER 17


 


Acetaminophen [Tylenol] 650 mg PO Q4HR PRN #0 tablet 17 


 


Cholecalciferol (Vitamin D3) 5,000 unit PO DAILY #30 capsule 17





[Vitamin D3]   


 


Finasteride [Proscar] 5 mg PO DAILY  tablet 17


 


Omega-3 Fatty Acids/Fish Oil 1 each PO DAILYWM #90 capsule 17





[Omega-3 Fish Oil 1,000 mg Sfgl]   


 


Pantoprazole [Protonix] 40 mg PO QDAC #30 tablet 17


 


Ubidecarenone/Vitamin E Mixed 1 each PO DAILY #30 capsule 17





[Coq10-Vit E 200 mg-20 Unit Sfg]   














- Allergies


Allergies/Adverse Reactions: 


                                    Allergies











Allergy/AdvReac Type Severity Reaction Status Date / Time


 


No Known Drug Allergies Allergy   Verified 21 14:23














Review of Systems





- Constitutional


Constitutional: denies: Fatigue, Fever, Chills, Weakness, Diaphoresis





- Eyes


Eyes: denies: Pain, Blurred vision, Field loss, Vision loss





- Ears, Nose & Throat


Ears, Nose & Throat: denies: Ear pain, Vertigo, Nosebleeds, Bleeding gums





- Cardiovascular


Cariovascular: reports: Chest pain.  denies: Irregular heart rate, Palpitations,

Edema, Lightheadedness, Syncope, Exertional dyspnea, Decr. exercise tolerance





- Respiratory


Respiratory: denies: Cough, Wheezing, Snoring, Hemoptysis, Orthopnea, SOB at 

rest, SOB with exertion





- Gastrointestinal


Gastrointestinal: denies: Abdominal pain, Constipation, Diarrhea, Rectal 

bleeding, Black stools, Bloody stools, Nausea, Vomiting





- Genitourinary


Genitourinary: denies: Dysuria, Urgency, Incontinence





- Musculoskeletal


Musculoskeletal: denies: Muscle pain, Muscle aches, Limited range of motion





- Integumentary


Integumentary: denies: Rash, Lesions, Lumps





- Neurological


Neurological: denies: General weakness, Focal weakness, Headache, Dizziness, 

Numbness, Memory problems, Pre-existing deficit, Abnormal gait, Seizures, 

Incoordination, Slurred speech





- Psychiatric


Psychiatric: denies: Depression, Suicidal, Delusions





- Endocrine


Endocrine: denies: Polyuria, Polydypsia





- Hematologic/Lymphatic


Hematologic/Lymphatic: denies: Bruising, Petechiae, Blood clots


Prior Level of Functionality: 


Patient is independently at home








Exam





- Vital Signs


Vital Signs: 





                                Vital Signs x48h











  Temp Pulse Resp BP Pulse Ox


 


 21 14:29  36.5 C  68  16  124/83 H  97


 


 21 14:23  36.5 C  68  16  124/83 H  97














- Physical Exam


General Appearance: positive: No acute distress, Alert.  negative: Lethargic


Eyes Bilateral: positive: Normal inspection, PERRL, No lid inflammation


ENT: positive: ENT inspection nml, No signs of dehydration.  negative: Purulent 

nasal drainage


Neck: positive: Nml inspection, Trachea midline.  negative: Thyromegaly, 

Tracheal deviation


Respiratory: positive: Chest non-tender, No respiratory distress, Breath sounds 

nml.  negative: Wheezes, Rales


Cardiovascular: positive: Regular rate & rhythm, No murmur.  negative: 

Tachycardia, Bradycardia, Systolic murmur, Diastolic murmur


Peripheral Pulses: positive: 2+


Abdomen: positive: Non-tender, Nml bowel sounds, No distention.  negative: 

Tenderness, Guarding, Rebound


Back: positive: Nml inspection.  negative: CVA tenderness (R), CVA tenderness 

(L)


Skin: positive: Color nml, Warm, Dry.  negative: Cyanosis, Diaphoresis, Pallor


Extremities: positive: Non-tender, Full ROM, Nml appearance.  negative: Calf 

tenderness


Neurologic/Psychiatric: positive: Oriented x3, Motor nml, Sensation nml, 

Mood/affect nml.  negative: Weakness, Sensory loss, Facial droop, Slurred/abnml 

speech, Depressed mood/affect





Conclusion/Plan





- Problem List


(1) Chest pain


Conclusion/Plan: 


Patient complaint chest pain, patient has a history of hypertension but non-

smoke. Initially troponin and EKG was unremarkable. We will continue troponin 

serial test, order aspirin, Continue telemetry, Morphine for chest pain as 

needed, lipid test. Because tomorrow we do not have NM, instead we will do 

stress ECHO test, Continue vital signs and Laboratory test monitor


Qualifiers: 


   Chest pain type: unspecified   Qualified Code(s): R07.9 - Chest pain, 

unspecified   





(2) HTN (hypertension)


Conclusion/Plan: 


Patient blood pressure is stable now, we will resume patient home blood pressure

medicine as confirmed








(3) BPH (benign prostatic hyperplasia)


Conclusion/Plan: 


History of BPH, we will resume Flomax








(4) GERD (gastroesophageal reflux disease)


Conclusion/Plan: 


We will add Pepcid for pt








- Lab Results


Fish Bones: 


                                 21 14:43





                                 21 14:43





Core Measures





- Anticipated LOS


I expect patient to be DC'd or transferred within 96 hours.: Yes





- DVT/VTE - Prophylaxis


VTE/DVT Device ordered at admit?: Yes


VTE/DVT Prophylaxis med ordered at admit?: Yes

## 2021-02-24 NOTE — XRAY REPORT
PROCEDURE:  Chest 1 View X-Ray

 

INDICATIONS:  Chest Pain

 

TECHNIQUE:  One view of the chest was acquired.  

 

COMPARISON:  Chest radiograph 7/15/2019

 

FINDINGS:  

 

Surgical changes and devices:  None.  

 

Lungs and pleura:  No pleural effusions or pneumothorax.  Lungs are clear.  

 

Mediastinum:  Mediastinal contours appear normal.  Heart size is normal.  

 

Bones and chest wall:  No suspicious bony lesions.  Overlying soft tissues appear unremarkable.  

 

IMPRESSION:  

No acute cardiopulmonary abnormality.

 

Reviewed by: Alexander Hardy MD on 2/24/2021 2:38 PM PST

Approved by: Alexander Hardy MD on 2/24/2021 2:38 PM PST

 

 

Station ID:  [id number]

## 2021-02-24 NOTE — ED PHYSICIAN DOCUMENTATION
History of Present Illness





- Stated complaint


Stated Complaint: L SIDE CHEST PX





- Additonal information


Additional information: 


80-year-old male presents the emergency department for evaluation of acute onset

left-sided chest pain.  He reports that at approximately noon he was walking at 

[employer] when he felt a sharp pain on his left chest that radiated to his left

arm.  Denies associated nausea vomiting or SOB. He then presented to the [facility] station for further evaluation and was transported to the emergency 

department.  This gentleman denies any previous history of myocardial infarction

though he does have a history of hypertension.  Non-smoker.  EKG here shows 

nonischemic right bundle branch unchanged from 2019.








Review of Systems


Constitutional: denies: Fever, Chills


Eyes: reports: Reviewed and negative


Ears: reports: Reviewed and negative


Nose: reports: Reviewed and negative


Throat: reports: Reviewed and negative


Cardiac: reports: Chest pain / pressure.  denies: Palpitations, Pedal edema, 

Calf pain


Respiratory: denies: Dyspnea, Cough


GI: denies: Abdominal Pain, Abdominal Swelling, Nausea, Vomiting


: denies: Dysuria


Skin: denies: Rash, Lesions


Musculoskeletal: denies: Neck pain, Back pain


Neurologic: reports: Reviewed and negative





PD PAST MEDICAL HISTORY





- Past Medical History


Cardiovascular: Hypertension


Respiratory: None


Endocrine/Autoimmune: None


GI: GERD


: None


HEENT: None


Psych: None


Musculoskeletal: None


Derm: None





- Past Surgical History


Past Surgical History: Yes


General: Colonoscopy, EGD


Ortho: Knee replacement


HEENT: Cataracts





- Present Medications


Home Medications: 


                                Ambulatory Orders











 Medication  Instructions  Recorded  Confirmed


 


Omeprazole 20 mg PO QDAC 01/15/16 05/07/18


 


lisinopriL [Lisinopril] 10 mg PO DAILY 01/15/16 05/07/18


 


Aspirin [Aspirin EC] 81 mg PO DAILY 02/02/17 05/07/18


 


Escitalopram [Lexapro] 10 mg PO DAILY 02/02/17 05/07/18


 


Multivitamin [Multivitamins] 1 each PO DAILY 02/02/17 05/07/18


 


Tamsulosin [Flomax] 0.4 mg PO QDDINNER 02/02/17 05/07/18


 


Acetaminophen [Tylenol] 650 mg PO Q4HR PRN #0 tablet 02/03/17 


 


Cholecalciferol (Vitamin D3) 5,000 unit PO DAILY #30 capsule 02/03/17 05/07/18





[Vitamin D3]   


 


Finasteride [Proscar] 5 mg PO DAILY  tablet 02/03/17 05/07/18


 


Omega-3 Fatty Acids/Fish Oil 1 each PO DAILYWM #90 capsule 02/03/17 05/07/18





[Omega-3 Fish Oil 1,000 mg Sfgl]   


 


Pantoprazole [Protonix] 40 mg PO QDAC #30 tablet 02/03/17 05/07/18


 


Ubidecarenone/Vitamin E Mixed 1 each PO DAILY #30 capsule 02/03/17 05/07/18





[Coq10-Vit E 200 mg-20 Unit Sfg]   


 


Hydrocodone/Acetaminophen 1 - 2 each PO Q6H PRN #9 tablet 06/16/18 





[Hydrocodon-Acetaminophen 5-325]   


 


Meloxicam [Mobic] 7.5 mg PO BID PRN #20 tablet 06/16/18 














- Allergies


Allergies/Adverse Reactions: 


                                    Allergies











Allergy/AdvReac Type Severity Reaction Status Date / Time


 


No Known Drug Allergies Allergy   Verified 02/24/21 14:23














- Social History


Does the pt smoke?: Yes


Smoking Status: Former smoker


Does the pt drink ETOH?: Yes


Does the pt have substance abuse?: No





PD ED PE NORMAL





- General


General: Alert and oriented X 3, No acute distress





- Cardiac


Cardiac: RRR, No murmur





- Respiratory


Respiratory: No respiratory distress, Clear bilaterally





- Abdomen


Abdomen: Normal bowel sounds, Soft, Non tender





- Derm


Derm: Normal color, Warm and dry, No rash





- Extremities


Extremities: No deformity, No tenderness to palpate, Normal ROM s pain





- Neuro


Neuro: Alert and oriented X 3, CNs 2-12 intact, No motor deficit, No sensory 

deficit


Eye Opening: Spontaneous


Motor: Obeys Commands


Verbal: Oriented


GCS Score: 15





Results





- Vitals


Vitals: 


                               Vital Signs - 24 hr











  02/24/21 02/24/21





  14:23 14:29


 


Temperature 36.5 C 36.5 C


 


Heart Rate 68 68


 


Respiratory 16 16





Rate  


 


Blood Pressure 124/83 H 124/83 H


 


O2 Saturation 97 97








                                     Oxygen











O2 Source                      Room air

















- EKG (time done)


  ** 1412


Rate: Rate (enter#) (68)


Rhythm: NSR


Intervals: RBBB


QRS: Normal


Ischemia: Normal ST segments


Compare to prior EKG: Unchanged from prior EKG


Computer interpretation: Agree with computer





- Labs


Labs: 


                                Laboratory Tests











  02/24/21 02/24/21 02/24/21





  14:43 14:43 14:43


 


WBC  6.8  


 


RBC  3.74 L  


 


Hgb  11.8 L  


 


Hct  35.3 L  


 


MCV  94.4 H  


 


MCH  31.6 H  


 


MCHC  33.4  


 


RDW  12.9  


 


Plt Count  276  


 


MPV  9.7  


 


Neut # (Auto)  2.8  


 


Lymph # (Auto)  2.9  


 


Mono # (Auto)  0.7  


 


Eos # (Auto)  0.2  


 


Baso # (Auto)  0.1  


 


Absolute Nucleated RBC  0.00  


 


Nucleated RBC %  0.0  


 


Sodium   136 


 


Potassium   4.0 


 


Chloride   103 


 


Carbon Dioxide   22 


 


Anion Gap   11.0 


 


BUN   17 


 


Creatinine   0.8 


 


Estimated GFR (MDRD)   93 


 


Glucose   82 


 


Calcium   9.2 


 


Total Bilirubin   0.5 


 


AST   16 


 


ALT   13 


 


Alkaline Phosphatase   46 


 


Troponin I High Sens    4.2


 


Total Protein   6.8 


 


Albumin   3.9 


 


Globulin   2.9 


 


Albumin/Globulin Ratio   1.3 


 


Lipase   30 














- Rads (name of study)


  ** CXR


Radiology: Final report received (No acute cardiopulmonary process)





PD MEDICAL DECISION MAKING





- ED course


Complexity details: reviewed results, re-evaluated patient, considered 

differential, d/w patient


ED course: 


80-year-old male presents to the emergency department for evaluation of acute 

left-sided chest pain that occurred while walking through [employer].  Patient 

does have a history of hypertension and is a former oral nicotine user.  At the 

time that he presented to the emergency department he was free of chest pain.  

His EKG nonischemic and shows an unchanged old right bundle branch block.  His 

high-sensitivity troponin was negative.  However this gentleman scores fairly 

high on the heart pathway of 5.  I have discussed this case with hospitalist Dr. Barrow who agrees patient bring patient in under an observation status for 

further stress testing and evaluation of chest pain likely anginal equivalent.








Departure





- Departure


Disposition: ED Place in Observation


Clinical Impression: 


Chest pain


Qualifiers:


 Chest pain type: unspecified Qualified Code(s): R07.9 - Chest pain, unspecified

## 2021-02-25 VITALS — DIASTOLIC BLOOD PRESSURE: 84 MMHG | SYSTOLIC BLOOD PRESSURE: 130 MMHG

## 2021-02-25 LAB
ANION GAP SERPL CALCULATED.4IONS-SCNC: 10 MMOL/L (ref 6–13)
BASOPHILS NFR BLD AUTO: 0.1 10^3/UL (ref 0–0.1)
BASOPHILS NFR BLD AUTO: 1.4 %
BUN SERPL-MCNC: 18 MG/DL (ref 6–20)
CALCIUM UR-MCNC: 8.6 MG/DL (ref 8.5–10.3)
CHLORIDE SERPL-SCNC: 101 MMOL/L (ref 101–111)
CHOLEST SERPL-MCNC: 158 MG/DL
CO2 SERPL-SCNC: 24 MMOL/L (ref 21–32)
CREAT SERPLBLD-SCNC: 1.1 MG/DL (ref 0.6–1.2)
EOSINOPHIL # BLD AUTO: 0.3 10^3/UL (ref 0–0.7)
EOSINOPHIL NFR BLD AUTO: 5.1 %
ERYTHROCYTE [DISTWIDTH] IN BLOOD BY AUTOMATED COUNT: 12.8 % (ref 12–15)
GFRSERPLBLD MDRD-ARVRAT: 65 ML/MIN/{1.73_M2} (ref 89–?)
GLUCOSE SERPL-MCNC: 98 MG/DL (ref 70–100)
HCT VFR BLD AUTO: 34.4 % (ref 42–52)
HDLC SERPL-MCNC: 55 MG/DL
HDLC SERPL: 2.9 {RATIO} (ref ?–5)
HGB UR QL STRIP: 11.3 G/DL (ref 14–18)
LDLC SERPL CALC-MCNC: 88 MG/DL
LDLC/HDLC SERPL: 1.6 {RATIO} (ref ?–3.6)
LYMPHOCYTES # SPEC AUTO: 2.3 10^3/UL (ref 1.5–3.5)
LYMPHOCYTES NFR BLD AUTO: 39.5 %
MCH RBC QN AUTO: 31.2 PG (ref 27–31)
MCHC RBC AUTO-ENTMCNC: 32.8 G/DL (ref 32–36)
MCV RBC AUTO: 95 FL (ref 80–94)
MONOCYTES # BLD AUTO: 0.7 10^3/UL (ref 0–1)
MONOCYTES NFR BLD AUTO: 11.3 %
NEUTROPHILS # BLD AUTO: 2.4 10^3/UL (ref 1.5–6.6)
NEUTROPHILS # SNV AUTO: 5.7 X10^3/UL (ref 4.8–10.8)
NEUTROPHILS NFR BLD AUTO: 42.4 %
NRBC # BLD AUTO: 0 /100WBC
NRBC # BLD AUTO: 0 X10^3/UL
PDW BLD AUTO: 10.2 FL (ref 7.4–11.4)
PLATELET # BLD: 269 10^3/UL (ref 130–450)
POTASSIUM SERPL-SCNC: 4 MMOL/L (ref 3.5–5)
RBC MAR: 3.62 10^6/UL (ref 4.7–6.1)
SODIUM SERPLBLD-SCNC: 135 MMOL/L (ref 135–145)
TRIGL P FAST SERPL-MCNC: 73 MG/DL
VLDLC SERPL-SCNC: 15 MG/DL

## 2021-02-25 RX ADMIN — SODIUM CHLORIDE, PRESERVATIVE FREE SCH ML: 5 INJECTION INTRAVENOUS at 01:01

## 2021-02-25 RX ADMIN — SODIUM CHLORIDE, PRESERVATIVE FREE SCH ML: 5 INJECTION INTRAVENOUS at 08:08

## 2021-02-25 RX ADMIN — FAMOTIDINE SCH MG: 20 TABLET, FILM COATED ORAL at 08:07

## 2021-02-25 NOTE — PHARMACY PROGRESS NOTE
- Best Possible Medication History


Admit Date and Time: 02/24/21 1545


Processed by: Pharmacy


Medication History completed: Yes


Patient Interview: Completed


Secondary Source(s): Physician records, Pharmacy records, Insurance records 

(PATIENT INTERVIEWED BY PHARMACY TECHNICIAN. PATIENT ABLE TO CONFIRM HOME 

MEDICATIONS )





As the person ultimately responsible for medication therapy, providers are able 

to order a medication from an existing home medication list in [facility] via the 

"Reconcile Routine" prior to Confirmation of that medication by support staff. 

Such practice is discouraged except when the physician, in their clinical 

judgment, deems that a medical need exists for a medication without regard to 

previous use.

## 2021-02-25 NOTE — DISCHARGE PLAN
Discharge Plan


Problem Reviewed?: Yes


Disposition: 01 Home, Self Care


Condition: Stable


Diet: Regular


Activity Restrictions: Activity as Tolerated


Shower Restrictions: No (fall precaution)


Instruction Topics:  ED Chest Pain Atypical Unkn Cause


Health Concerns: 


chest pain


Plan of Treatment: 


Your chest pain is reproducible and localized. Your studies including serial 

troponin, EKG, and ECHO are all negative for heart attack, unfortunately we 

could not do stress test for you in hospital on today. You may followup with 

your PCP as out-pt to have stress test. You may resume your home medications


Care Goals: 


stabilization and improvement of your medical conditions


Assessment: 


discussed the test results and care plan with you, you understood. 


Additional Instructions or Follow Up instructions: 


you may followup with your PCP in one to two weeks, may have stress test as out-

pt. Should your symptoms return or worsen, you may present ER or call 911 for 

help.


No Smoking: If you smoke, Please STOP!  Call [phone] for help.


Follow-up with: 


Celestine Daniels MD [Primary Care Provider] -

## 2021-02-25 NOTE — DISCHARGE SUMMARY
Discharge Summary


Admit Date: 02/24/21


Discharge Date: 02/25/21


Discharging Provider: Waylon Caraballo


Primary Care Provider: Dr.Dan Daniels


Condition at Discharge: Stable


Discharge Disposition: 01 Home, Self Care


Discharge Facility Name: home





- DIAGNOSES


Discharge Diagnoses with Status of Each Condition: 





(1) Chest pain


Pt's chest pain is reproducible and localized at the shoulder area, and the pain

is significantly reduced.  It is more likely muscle spasm. pt has no distress. 

pt is Hemodynamically stable. Your studies including serial troponin, EKG, and 

ECHO are all negative for acute MI. Unfortunately we could not do stress test 

for pt in hospital on today and explained the reason for pt, pt understood, and 

he may followup with his PCP as out-pt to have stress test. 





(2) HTN (hypertension)


stable





(3) BPH (benign prostatic hyperplasia)


stable





(4) GERD (gastroesophageal reflux disease)


stable








- HPI


History of Present Illness: 


80-year-old male With a past medical history significant for hypertension, BPH, 

GERD who presents the emergency department for evaluation of acute onset left-

sided chest pain.  Pt. brought in by EMS. He reports that at approximately 1230 

noon when he was walking at [employer], he felt pressure pain on his left chest 

that radiated to his left arm. He also feel some numbness in his left arm but he

has no weakness. He denies any other unilateral or bilateral weakness or 

paresthesia as well. The Pain was 5 out of 10 then pain has gone down to 3 out 

of 10. He denies any previous history of myocardial infarction. He also denies 

nausea, vomiting or associated shortness of breath. pt does have a history of 

hypertension, he is Non-smoker. Initially troponin is negative.  EKG shows 

nonischemic change with hx of right bundle branch unchanged since from 2019. 

Routine laboratory test was unremarkable. Chest x-ray was unremarkable as well. 

In ER, he is afebrile, patient is hemodynamic stable. 


Discussed the care goal with the patient, patient request full code but he does 

not want to have life support.  








- HOSPITAL COURSE


Hospital Course: 


Patient was admitted for left arm numbness, and left shoulder and chest pain. 

Patient's chest pain was reproducible and localized at left shoulder area. 

Patient's troponin test, EKG, echo study all are negative for acute myocardial 

ischemia. Unfortunately we cannot do stress test in the hospital today. explain 

to  the patient and patient may follow-up with his PCP to have stress test as 

outpatient. Patient verbally understand and will follow up with his PCP to have 

stress test as out-pt. Patient Is comfortable, no distress, patient chest pain 

is resolved. Patient is discharged as hemodynamic condition. 








- ALLERGIES


Allergies/Adverse Reactions: 


                                    Allergies











Allergy/AdvReac Type Severity Reaction Status Date / Time


 


No Known Drug Allergies Allergy   Verified 02/24/21 14:23














- MEDICATIONS


Home Medications: 


                                Ambulatory Orders











 Medication  Instructions  Recorded  Confirmed


 


Omeprazole 20 mg PO DAILY 01/15/16 02/25/21


 


lisinopriL [Lisinopril] 10 mg PO DAILY 01/15/16 02/25/21


 


Aspirin [Aspirin EC] 81 mg PO DAILY 02/02/17 02/25/21


 


Escitalopram [Lexapro] 10 mg PO DAILY 02/02/17 02/25/21


 


Multivitamin [Multivitamins] 1 each PO DAILY 02/02/17 02/25/21


 


Tamsulosin [Flomax] 0.4 mg PO QDDINNER 02/02/17 02/25/21


 


Acetaminophen [Tylenol] 650 mg PO Q4HR PRN #0 tablet 02/03/17 02/25/21


 


Cholecalciferol (Vitamin D3) 5,000 unit PO DAILY #30 capsule 02/03/17 02/25/21





[Vitamin D3]   


 


Finasteride [Proscar] 5 mg PO DAILY  tablet 02/03/17 02/25/21


 


Pantoprazole [Protonix] 40 mg PO QDAC #30 tablet 02/03/17 02/25/21














- PHYSICAL EXAM AT DISCHARGE


General Appearance: positive: No acute distress, Alert.  negative: Lethargic


Eyes Bilateral: positive: Normal inspection, PERRL, No lid inflammation


ENT: positive: ENT inspection nml, No signs of dehydration.  negative: Purulent 

nasal drainage


Neck: positive: Nml inspection, Trachea midline.  negative: Thyromegaly, 

Tracheal deviation


Respiratory: positive: Chest non-tender, No respiratory distress, Breath sounds 

nml.  negative: Wheezes, Rales, Rhonchi


Cardiovascular: positive: Regular rate & rhythm, No murmur.  negative: 

Tachycardia, Bradycardia, Systolic murmur, Diastolic murmur


Peripheral Pulses: positive: 2+


Abdomen: positive: Non-tender, Nml bowel sounds, No distention.  negative: 

Tenderness, Guarding, Rebound


Back: positive: Nml inspection.  negative: CVA tenderness (R), CVA tenderness 

(L)


Skin: positive: Color nml, Warm, Dry.  negative: Cyanosis, Diaphoresis, Pallor


Extremities: positive: Non-tender, Full ROM, Nml appearance.  negative: Calf 

tenderness


Neurologic/Psychiatric: positive: Oriented x3, Motor nml, Sensation nml, 

Mood/affect nml.  negative: Weakness, Sensory loss, Facial droop, Slurred/abnml 

speech, Depressed mood/affect





- LABS


Result Diagrams: 


                                 02/25/21 04:05





                                 02/25/21 04:05





- FOLLOW UP


Follow Up: 


Your chest pain is reproducible and localized. Your studies including serial 

troponin, EKG, and ECHO are all negative for heart attack, unfortunately we 

could not do stress test for you in hospital on today. You may followup with 

your PCP as out-pt to have stress test. You may resume your home medications


you may followup with your PCP in one to two weeks, may have stress test as out-

pt. Should your symptoms return or worsen, you may present ER or call 911 for 

help.





- TIME SPENT


Time Spent in Discharge (Minutes): 30

## 2021-04-27 ENCOUNTER — HOSPITAL ENCOUNTER (OUTPATIENT)
Dept: HOSPITAL 76 - LAB.WCP | Age: 81
Discharge: HOME | End: 2021-04-27
Attending: INTERNAL MEDICINE
Payer: MEDICARE

## 2021-04-27 DIAGNOSIS — R41.3: ICD-10-CM

## 2021-04-27 DIAGNOSIS — N40.0: ICD-10-CM

## 2021-04-27 DIAGNOSIS — I10: Primary | ICD-10-CM

## 2021-04-27 DIAGNOSIS — E55.9: ICD-10-CM

## 2021-04-27 LAB
ALBUMIN DIAFP-MCNC: 4.4 G/DL (ref 3.2–5.5)
ALBUMIN/GLOB SERPL: 1.4 {RATIO} (ref 1–2.2)
ALP SERPL-CCNC: 51 IU/L (ref 42–121)
ALT SERPL W P-5'-P-CCNC: 15 IU/L (ref 10–60)
ANION GAP SERPL CALCULATED.4IONS-SCNC: 12 MMOL/L (ref 6–13)
AST SERPL W P-5'-P-CCNC: 19 IU/L (ref 10–42)
BASOPHILS NFR BLD AUTO: 0.1 10^3/UL (ref 0–0.1)
BASOPHILS NFR BLD AUTO: 1 %
BILIRUB BLD-MCNC: 1.1 MG/DL (ref 0.2–1)
BUN SERPL-MCNC: 15 MG/DL (ref 6–20)
CALCIUM UR-MCNC: 9.8 MG/DL (ref 8.5–10.3)
CHLORIDE SERPL-SCNC: 98 MMOL/L (ref 101–111)
CHOLEST SERPL-MCNC: 203 MG/DL
CO2 SERPL-SCNC: 26 MMOL/L (ref 21–32)
CREAT SERPLBLD-SCNC: 0.9 MG/DL (ref 0.6–1.2)
EOSINOPHIL # BLD AUTO: 0.3 10^3/UL (ref 0–0.7)
EOSINOPHIL NFR BLD AUTO: 4.1 %
ERYTHROCYTE [DISTWIDTH] IN BLOOD BY AUTOMATED COUNT: 13.4 % (ref 12–15)
GFRSERPLBLD MDRD-ARVRAT: 82 ML/MIN/{1.73_M2} (ref 89–?)
GLOBULIN SER-MCNC: 3.2 G/DL (ref 2.1–4.2)
GLUCOSE SERPL-MCNC: 99 MG/DL (ref 70–100)
HCT VFR BLD AUTO: 41.4 % (ref 42–52)
HDLC SERPL-MCNC: 84 MG/DL
HDLC SERPL: 2.4 {RATIO} (ref ?–5)
HGB UR QL STRIP: 13.5 G/DL (ref 14–18)
LDLC SERPL CALC-MCNC: 101 MG/DL
LDLC/HDLC SERPL: 1.2 {RATIO} (ref ?–3.6)
LYMPHOCYTES # SPEC AUTO: 2.7 10^3/UL (ref 1.5–3.5)
LYMPHOCYTES NFR BLD AUTO: 42.3 %
MCH RBC QN AUTO: 30.8 PG (ref 27–31)
MCHC RBC AUTO-ENTMCNC: 32.6 G/DL (ref 32–36)
MCV RBC AUTO: 94.5 FL (ref 80–94)
MONOCYTES # BLD AUTO: 0.7 10^3/UL (ref 0–1)
MONOCYTES NFR BLD AUTO: 10.8 %
NEUTROPHILS # BLD AUTO: 2.6 10^3/UL (ref 1.5–6.6)
NEUTROPHILS # SNV AUTO: 6.3 X10^3/UL (ref 4.8–10.8)
NEUTROPHILS NFR BLD AUTO: 41.5 %
NRBC # BLD AUTO: 0 /100WBC
NRBC # BLD AUTO: 0 X10^3/UL
PDW BLD AUTO: 10.3 FL (ref 7.4–11.4)
PLATELET # BLD: 346 10^3/UL (ref 130–450)
POTASSIUM SERPL-SCNC: 4 MMOL/L (ref 3.5–5)
PROT SPEC-MCNC: 7.6 G/DL (ref 6.7–8.2)
RBC MAR: 4.38 10^6/UL (ref 4.7–6.1)
SODIUM SERPLBLD-SCNC: 136 MMOL/L (ref 135–145)
TRIGL P FAST SERPL-MCNC: 88 MG/DL
TSH SERPL-ACNC: 1.79 UIU/ML (ref 0.34–5.6)
VLDLC SERPL-SCNC: 18 MG/DL

## 2021-04-27 PROCEDURE — 84443 ASSAY THYROID STIM HORMONE: CPT

## 2021-04-27 PROCEDURE — 85025 COMPLETE CBC W/AUTO DIFF WBC: CPT

## 2021-04-27 PROCEDURE — 80061 LIPID PANEL: CPT

## 2021-04-27 PROCEDURE — 36415 COLL VENOUS BLD VENIPUNCTURE: CPT

## 2021-04-27 PROCEDURE — 82306 VITAMIN D 25 HYDROXY: CPT

## 2021-04-27 PROCEDURE — 84153 ASSAY OF PSA TOTAL: CPT

## 2021-04-27 PROCEDURE — 83721 ASSAY OF BLOOD LIPOPROTEIN: CPT

## 2021-04-27 PROCEDURE — 80053 COMPREHEN METABOLIC PANEL: CPT

## 2021-08-02 ENCOUNTER — HOSPITAL ENCOUNTER (OUTPATIENT)
Dept: HOSPITAL 76 - LAB.N | Age: 81
Discharge: HOME | End: 2021-08-02
Attending: INTERNAL MEDICINE
Payer: MEDICARE

## 2021-08-02 DIAGNOSIS — I10: Primary | ICD-10-CM

## 2021-08-02 DIAGNOSIS — E53.8: ICD-10-CM

## 2021-08-02 LAB
ANION GAP SERPL CALCULATED.4IONS-SCNC: 7 MMOL/L (ref 6–13)
BUN SERPL-MCNC: 18 MG/DL (ref 6–20)
CALCIUM UR-MCNC: 9.1 MG/DL (ref 8.5–10.3)
CHLORIDE SERPL-SCNC: 98 MMOL/L (ref 101–111)
CHOLEST SERPL-MCNC: 201 MG/DL
CO2 SERPL-SCNC: 27 MMOL/L (ref 21–32)
CREAT SERPLBLD-SCNC: 1 MG/DL (ref 0.6–1.2)
GFRSERPLBLD MDRD-ARVRAT: 72 ML/MIN/{1.73_M2} (ref 89–?)
GLUCOSE SERPL-MCNC: 101 MG/DL (ref 70–100)
HDLC SERPL-MCNC: 74 MG/DL
HDLC SERPL: 2.7 {RATIO} (ref ?–5)
LDLC SERPL CALC-MCNC: 111 MG/DL
LDLC/HDLC SERPL: 1.5 {RATIO} (ref ?–3.6)
POTASSIUM SERPL-SCNC: 4.2 MMOL/L (ref 3.5–5)
SODIUM SERPLBLD-SCNC: 132 MMOL/L (ref 135–145)
TRIGL P FAST SERPL-MCNC: 78 MG/DL
VLDLC SERPL-SCNC: 16 MG/DL

## 2021-08-02 PROCEDURE — 82607 VITAMIN B-12: CPT

## 2021-08-02 PROCEDURE — 36415 COLL VENOUS BLD VENIPUNCTURE: CPT

## 2021-08-02 PROCEDURE — 80061 LIPID PANEL: CPT

## 2021-08-02 PROCEDURE — 83721 ASSAY OF BLOOD LIPOPROTEIN: CPT

## 2021-08-02 PROCEDURE — 80048 BASIC METABOLIC PNL TOTAL CA: CPT

## 2021-10-18 ENCOUNTER — HOSPITAL ENCOUNTER (OUTPATIENT)
Dept: HOSPITAL 76 - COV | Age: 81
Discharge: HOME | End: 2021-10-18
Attending: PHYSICIAN ASSISTANT
Payer: MEDICARE

## 2021-10-18 DIAGNOSIS — Z20.822: ICD-10-CM

## 2021-10-18 DIAGNOSIS — Z01.812: Primary | ICD-10-CM

## 2022-01-02 ENCOUNTER — HOSPITAL ENCOUNTER (OUTPATIENT)
Dept: HOSPITAL 76 - LAB.N | Age: 82
End: 2022-01-02
Attending: FAMILY MEDICINE
Payer: MEDICARE

## 2022-01-02 ENCOUNTER — HOSPITAL ENCOUNTER (OUTPATIENT)
Dept: HOSPITAL 76 - DI.N | Age: 82
Discharge: HOME | End: 2022-01-02
Attending: FAMILY MEDICINE
Payer: MEDICARE

## 2022-01-02 DIAGNOSIS — Z20.822: ICD-10-CM

## 2022-01-02 DIAGNOSIS — R05.8: Primary | ICD-10-CM

## 2022-01-02 PROCEDURE — 87275 INFLUENZA B AG IF: CPT

## 2022-01-02 PROCEDURE — 87276 INFLUENZA A AG IF: CPT

## 2022-01-02 NOTE — XRAY REPORT
PROCEDURE:  Chest 2 View X-Ray

 

INDICATIONS:  COUGH

 

TECHNIQUE:  2 views of the chest.  

 

COMPARISON:  2/24/2021.

 

FINDINGS:  

 

Surgical changes and devices:  None.  

 

Lungs and pleura:  No pleural effusions or pneumothorax.  Lungs are clear. There is hyperinflation of
 the lungs with mild flattening of hemidiaphragms suggestive of COPD. 

 

Mediastinum:  Mediastinal contours are normal.  Heart size is normal.  

 

Bones and chest wall:  No suspicious bony abnormalities.  Soft tissues appear unremarkable.  

 

IMPRESSION:  

 

1. No acute cardiopulmonary disease.

 

Reviewed by: Driss Arguello MD on 1/2/2022 10:34 PM PST

Approved by: Driss Arguello MD on 1/2/2022 10:34 PM [facility]

 

 

Station ID:  [id number]

## 2022-02-27 ENCOUNTER — HOSPITAL ENCOUNTER (OUTPATIENT)
Dept: HOSPITAL 76 - LAB.N | Age: 82
Discharge: HOME | End: 2022-02-27
Attending: PHYSICIAN ASSISTANT
Payer: MEDICARE

## 2022-02-27 DIAGNOSIS — R05.8: Primary | ICD-10-CM

## 2022-02-27 DIAGNOSIS — Z20.822: ICD-10-CM

## 2022-03-11 ENCOUNTER — HOSPITAL ENCOUNTER (OUTPATIENT)
Dept: HOSPITAL 76 - LAB.N | Age: 82
Discharge: HOME | End: 2022-03-11
Attending: INTERNAL MEDICINE
Payer: MEDICARE

## 2022-03-11 DIAGNOSIS — E53.8: ICD-10-CM

## 2022-03-11 DIAGNOSIS — E55.9: ICD-10-CM

## 2022-03-11 DIAGNOSIS — I10: Primary | ICD-10-CM

## 2022-03-11 DIAGNOSIS — R97.20: ICD-10-CM

## 2022-03-11 LAB
ALBUMIN DIAFP-MCNC: 4.2 G/DL (ref 3.2–5.5)
ALBUMIN/GLOB SERPL: 1.4 {RATIO} (ref 1–2.2)
ALP SERPL-CCNC: 42 IU/L (ref 42–121)
ALT SERPL W P-5'-P-CCNC: 16 IU/L (ref 10–60)
ANION GAP SERPL CALCULATED.4IONS-SCNC: 7 MMOL/L (ref 6–13)
AST SERPL W P-5'-P-CCNC: 19 IU/L (ref 10–42)
BASOPHILS # BLD MANUAL: 0.1 10^3/UL (ref 0–0.1)
BASOPHILS NFR BLD AUTO: 0.7 %
BASOPHILS NFR SPEC MANUAL: 1 %
BILIRUB BLD-MCNC: 0.8 MG/DL (ref 0.2–1)
BUN SERPL-MCNC: 16 MG/DL (ref 6–20)
CALCIUM UR-MCNC: 9.4 MG/DL (ref 8.5–10.3)
CHLORIDE SERPL-SCNC: 95 MMOL/L (ref 101–111)
CHOLEST SERPL-MCNC: 197 MG/DL
CO2 SERPL-SCNC: 30 MMOL/L (ref 21–32)
CREAT SERPLBLD-SCNC: 1 MG/DL (ref 0.6–1.2)
EOSINOPHIL # BLD MANUAL: 0 10^3/UL (ref 0–0.7)
EOSINOPHIL NFR BLD AUTO: 1.9 %
ERYTHROCYTE [DISTWIDTH] IN BLOOD BY AUTOMATED COUNT: 13.3 % (ref 12–15)
GFRSERPLBLD MDRD-ARVRAT: 72 ML/MIN/{1.73_M2} (ref 89–?)
GLOBULIN SER-MCNC: 3.1 G/DL (ref 2.1–4.2)
GLUCOSE SERPL-MCNC: 94 MG/DL (ref 70–100)
HCT VFR BLD AUTO: 39.5 % (ref 42–52)
HDLC SERPL-MCNC: 95 MG/DL
HDLC SERPL: 2.1 {RATIO} (ref ?–5)
HGB UR QL STRIP: 13.2 G/DL (ref 14–18)
LDLC SERPL CALC-MCNC: 91 MG/DL
LDLC/HDLC SERPL: 1 {RATIO} (ref ?–3.6)
LYMPH ABN NFR BLD MANUAL: 2 %
LYMPHOBLASTS # BLD: 45 %
LYMPHOCYTES # BLD MANUAL: 6 10^3/UL (ref 1.5–3.5)
LYMPHOCYTES NFR BLD AUTO: 51 %
MCH RBC QN AUTO: 31 PG (ref 27–31)
MCHC RBC AUTO-ENTMCNC: 33.4 G/DL (ref 32–36)
MCV RBC AUTO: 92.7 FL (ref 80–94)
MONOCYTES # BLD MANUAL: 0.6 10^3/UL (ref 0–1)
MONOCYTES NFR BLD AUTO: 8.8 %
NEUTROPHILS # SNV AUTO: 10.5 X10^3/UL (ref 4.8–10.8)
NEUTROPHILS NFR BLD AUTO: 37.1 %
NEUTROPHILS NFR BLD MANUAL: 3.8 10^3/UL (ref 1.5–6.6)
NEUTS BAND NFR BLD: 0 %
PDW BLD AUTO: 10.3 FL (ref 7.4–11.4)
PLAT MORPH BLD: (no result)
PLATELET # BLD: 368 10^3/UL (ref 130–450)
PLATELET BLD QL SMEAR: (no result)
POTASSIUM SERPL-SCNC: 4.4 MMOL/L (ref 3.5–5)
PROT SPEC-MCNC: 7.3 G/DL (ref 6.7–8.2)
RBC MAR: 4.26 10^6/UL (ref 4.7–6.1)
RBC MORPH BLD: (no result)
SODIUM SERPLBLD-SCNC: 132 MMOL/L (ref 135–145)
TRIGL P FAST SERPL-MCNC: 57 MG/DL
VARIANT LYMPHS NFR BLD MANUAL: 10 %
VLDLC SERPL-SCNC: 11 MG/DL
WBC MORPH BLD: (no result)

## 2022-03-11 PROCEDURE — 84153 ASSAY OF PSA TOTAL: CPT

## 2022-03-11 PROCEDURE — 80061 LIPID PANEL: CPT

## 2022-03-11 PROCEDURE — 82607 VITAMIN B-12: CPT

## 2022-03-11 PROCEDURE — 36415 COLL VENOUS BLD VENIPUNCTURE: CPT

## 2022-03-11 PROCEDURE — 80053 COMPREHEN METABOLIC PANEL: CPT

## 2022-03-11 PROCEDURE — 82306 VITAMIN D 25 HYDROXY: CPT

## 2022-03-11 PROCEDURE — 83721 ASSAY OF BLOOD LIPOPROTEIN: CPT

## 2022-03-11 PROCEDURE — 85025 COMPLETE CBC W/AUTO DIFF WBC: CPT

## 2022-04-10 ENCOUNTER — HOSPITAL ENCOUNTER (OUTPATIENT)
Dept: HOSPITAL 76 - RT | Age: 82
Discharge: HOME | End: 2022-04-10
Attending: INTERNAL MEDICINE
Payer: MEDICARE

## 2022-04-10 DIAGNOSIS — R05.8: Primary | ICD-10-CM

## 2022-04-10 DIAGNOSIS — Z87.891: ICD-10-CM

## 2022-04-10 PROCEDURE — 94729 DIFFUSING CAPACITY: CPT

## 2022-04-10 PROCEDURE — 94060 EVALUATION OF WHEEZING: CPT

## 2022-06-27 ENCOUNTER — HOSPITAL ENCOUNTER (OUTPATIENT)
Dept: HOSPITAL 76 - DI | Age: 82
Discharge: HOME | End: 2022-06-27
Attending: INTERNAL MEDICINE
Payer: MEDICARE

## 2022-06-27 DIAGNOSIS — Z13.6: Primary | ICD-10-CM

## 2022-06-27 DIAGNOSIS — Z87.891: ICD-10-CM

## 2022-06-27 DIAGNOSIS — I77.811: ICD-10-CM

## 2022-06-27 NOTE — ULTRASOUND REPORT
PROCEDURE:  Aorta Screening

 

INDICATIONS:  HIST OF SMOKING

 

TECHNIQUE:  Real time scanning was performed of the aorta and iliac arteries, with image documentatio
n.  

 

COMPARISON:  None

 

FINDINGS:  

Aorta:  Proximal aortic diameter measures 2.8 x 2.9 cm.  Mid-aorta measures 2.6 x 2.8 cm.  Distal aor
tic diameter is 2.3 x 2.3 cm.  

 

Iliac arteries:  Right common iliac artery measures 1.4 x 1.4 cm.  Left common iliac artery measures 
1.3 x 1.3 cm. 

 

Scattered calcified plaque seen throughout the abdominal aorta and proximal common iliac vessels. 

 

IMPRESSION:  

Abdominal aortic ectasia. Recommend follow-up screening ultrasound in 5 years.

 

Reviewed by: Nancy Collins MD, PhD on 6/27/2022 10:46 AM PDT

Approved by: Nancy Collins MD, PhD on 6/27/2022 10:46 AM PDT

 

 

Station ID:  [id number]

## 2023-09-21 ENCOUNTER — HOSPITAL ENCOUNTER (OUTPATIENT)
Dept: HOSPITAL 76 - LAB.N | Age: 83
Discharge: HOME | End: 2023-09-21
Attending: INTERNAL MEDICINE
Payer: MEDICARE

## 2023-09-21 DIAGNOSIS — R97.20: ICD-10-CM

## 2023-09-21 DIAGNOSIS — R41.3: ICD-10-CM

## 2023-09-21 DIAGNOSIS — E55.9: ICD-10-CM

## 2023-09-21 DIAGNOSIS — D72.820: ICD-10-CM

## 2023-09-21 DIAGNOSIS — I10: Primary | ICD-10-CM

## 2023-09-21 DIAGNOSIS — E53.8: ICD-10-CM

## 2023-09-21 LAB
ALBUMIN DIAFP-MCNC: 4.4 G/DL (ref 3.2–5.5)
ALBUMIN/GLOB SERPL: 1.5 {RATIO} (ref 1–2.2)
ALP SERPL-CCNC: 59 IU/L (ref 42–121)
ALT SERPL W P-5'-P-CCNC: 12 IU/L (ref 10–60)
ANION GAP SERPL CALCULATED.4IONS-SCNC: 9 MMOL/L (ref 6–13)
AST SERPL W P-5'-P-CCNC: 18 IU/L (ref 10–42)
BASOPHILS NFR BLD AUTO: 0.1 10^3/UL (ref 0–0.1)
BASOPHILS NFR BLD AUTO: 0.8 %
BILIRUB BLD-MCNC: 0.7 MG/DL (ref 0.2–1)
BUN SERPL-MCNC: 12 MG/DL (ref 6–20)
CALCIUM UR-MCNC: 9.8 MG/DL (ref 8.5–10.3)
CHLORIDE SERPL-SCNC: 101 MMOL/L (ref 101–111)
CO2 SERPL-SCNC: 27 MMOL/L (ref 21–32)
CREAT SERPLBLD-SCNC: 1 MG/DL (ref 0.6–1.3)
EOSINOPHIL # BLD AUTO: 0.2 10^3/UL (ref 0–0.7)
EOSINOPHIL NFR BLD AUTO: 1.9 %
ERYTHROCYTE [DISTWIDTH] IN BLOOD BY AUTOMATED COUNT: 13.9 % (ref 12–15)
GFRSERPLBLD MDRD-ARVRAT: 72 ML/MIN/{1.73_M2} (ref 89–?)
GLOBULIN SER-MCNC: 2.9 G/DL (ref 2.1–4.2)
GLUCOSE SERPL-MCNC: 91 MG/DL (ref 74–104)
HCT VFR BLD AUTO: 39.8 % (ref 42–52)
HGB UR QL STRIP: 13.2 G/DL (ref 14–18)
LYMPHOCYTES # SPEC AUTO: 2.7 10^3/UL (ref 1.5–3.5)
LYMPHOCYTES NFR BLD AUTO: 34.3 %
MCH RBC QN AUTO: 31.5 PG (ref 27–31)
MCHC RBC AUTO-ENTMCNC: 33.2 G/DL (ref 32–36)
MCV RBC AUTO: 95 FL (ref 80–94)
MONOCYTES # BLD AUTO: 0.8 10^3/UL (ref 0–1)
MONOCYTES NFR BLD AUTO: 10.4 %
NEUTROPHILS # BLD AUTO: 4.1 10^3/UL (ref 1.5–6.6)
NEUTROPHILS # SNV AUTO: 7.9 X10^3/UL (ref 4.8–10.8)
NEUTROPHILS NFR BLD AUTO: 52.2 %
NRBC # BLD AUTO: 0 /100WBC
NRBC # BLD AUTO: 0 X10^3/UL
PDW BLD AUTO: 10.5 FL (ref 7.4–11.4)
PLATELET # BLD: 336 10^3/UL (ref 130–450)
POTASSIUM SERPL-SCNC: 4.5 MMOL/L (ref 3.5–4.5)
PROT SPEC-MCNC: 7.3 G/DL (ref 6.4–8.9)
RBC MAR: 4.19 10^6/UL (ref 4.7–6.1)
SODIUM SERPLBLD-SCNC: 137 MMOL/L (ref 135–145)
TSH SERPL-ACNC: 1.74 UIU/ML (ref 0.34–5.6)

## 2023-09-21 PROCEDURE — 80053 COMPREHEN METABOLIC PANEL: CPT

## 2023-09-21 PROCEDURE — 36415 COLL VENOUS BLD VENIPUNCTURE: CPT

## 2023-09-21 PROCEDURE — 84153 ASSAY OF PSA TOTAL: CPT

## 2023-09-21 PROCEDURE — 85025 COMPLETE CBC W/AUTO DIFF WBC: CPT

## 2023-09-21 PROCEDURE — 82607 VITAMIN B-12: CPT

## 2023-09-21 PROCEDURE — 82306 VITAMIN D 25 HYDROXY: CPT

## 2023-09-21 PROCEDURE — 84443 ASSAY THYROID STIM HORMONE: CPT

## 2024-07-15 ENCOUNTER — HOSPITAL ENCOUNTER (OUTPATIENT)
Dept: HOSPITAL 76 - DI.N | Age: 84
Discharge: HOME | End: 2024-07-15
Attending: PHYSICIAN ASSISTANT
Payer: MEDICARE

## 2024-07-15 DIAGNOSIS — R07.81: Primary | ICD-10-CM

## 2024-07-15 DIAGNOSIS — R10.819: ICD-10-CM

## 2024-07-15 NOTE — XRAY REPORT
PROCEDURE:  Ribs w/PA Chest 3+V RT

 

INDICATIONS:  HISTORY OF FALLING

 

TECHNIQUE:  3 views of the ribs were acquired, along with a single view chest.  

 

COMPARISON:  1/2/2022.

 

FINDINGS:  

 

Surgical changes and devices:  None.  

 

Bones and chest wall:  No fractures or dislocations.  No suspicious bony lesions.  Overlying soft tis
sues appear unremarkable.  

 

Lungs and pleura:  No pleural effusions or pneumothorax.  Lungs appear clear.  

 

Mediastinum:  Mediastinal contours appear normal.  Heart size is normal.  

 

 

IMPRESSION:  

No displaced rib fracture or pneumothorax.

 

 

 

Reviewed by: Benigno German MD on 7/15/2024 4:34 PM PDT

Approved by: Benigno German MD on 7/15/2024 4:34 PM PDT

 

 

Station ID:  [id number]

## 2024-07-15 NOTE — XRAY REPORT
PROCEDURE:  Abdomen 1 V

 

INDICATIONS:  HISTORY OF FALLING

 

TECHNIQUE:  One view of the abdomen acquired.  

 

COMPARISON:  None.

 

FINDINGS:  

 

Surgical changes and devices:  None.  

 

Bowel:  Bowel gas pattern is normal.  

 

Soft tissues:  No suspicious abdominal calcifications.  Visualized solid organ contours appear normal
 in size.  

 

Bones:  No suspicious bony lesions.  Degenerative changes the spine with mild dextrocurvature.

 

IMPRESSION:  

No acute abdominal pathology.

 

 

 

Reviewed by: Benigno German MD on 7/15/2024 4:35 PM PDT

Approved by: Benigno German MD on 7/15/2024 4:35 PM PDT

 

 

Station ID:  [id number]